# Patient Record
Sex: MALE | Race: WHITE | Employment: OTHER | URBAN - METROPOLITAN AREA
[De-identification: names, ages, dates, MRNs, and addresses within clinical notes are randomized per-mention and may not be internally consistent; named-entity substitution may affect disease eponyms.]

---

## 2017-01-23 PROBLEM — A60.00 GENITAL HERPES SIMPLEX: Status: ACTIVE | Noted: 2017-01-23

## 2017-01-23 PROBLEM — A60.00 GENITAL HERPES SIMPLEX: Status: RESOLVED | Noted: 2017-01-23 | Resolved: 2017-01-23

## 2017-08-02 DIAGNOSIS — B00.9 HERPES SIMPLEX: ICD-10-CM

## 2017-08-02 RX ORDER — VALACYCLOVIR HYDROCHLORIDE 500 MG/1
500 TABLET, FILM COATED ORAL 2 TIMES DAILY
Qty: 6 TABLET | Refills: 2 | Status: SHIPPED | OUTPATIENT
Start: 2017-08-02 | End: 2017-08-03 | Stop reason: SDUPTHER

## 2017-08-03 DIAGNOSIS — B00.9 HERPES SIMPLEX: ICD-10-CM

## 2017-08-03 RX ORDER — VALACYCLOVIR HYDROCHLORIDE 500 MG/1
500 TABLET, FILM COATED ORAL 2 TIMES DAILY
Qty: 30 TABLET | Refills: 2 | Status: SHIPPED | OUTPATIENT
Start: 2017-08-03 | End: 2017-11-07 | Stop reason: SDUPTHER

## 2017-09-25 ENCOUNTER — OFFICE VISIT (OUTPATIENT)
Dept: FAMILY MEDICINE CLINIC | Age: 72
End: 2017-09-25
Payer: MEDICARE

## 2017-09-25 VITALS
WEIGHT: 248.8 LBS | HEIGHT: 70 IN | SYSTOLIC BLOOD PRESSURE: 116 MMHG | DIASTOLIC BLOOD PRESSURE: 82 MMHG | BODY MASS INDEX: 35.62 KG/M2 | HEART RATE: 66 BPM

## 2017-09-25 DIAGNOSIS — E55.9 VITAMIN D DEFICIENCY: ICD-10-CM

## 2017-09-25 DIAGNOSIS — F41.1 GAD (GENERALIZED ANXIETY DISORDER): ICD-10-CM

## 2017-09-25 DIAGNOSIS — E66.9 OBESITY, UNSPECIFIED OBESITY SEVERITY, UNSPECIFIED OBESITY TYPE: ICD-10-CM

## 2017-09-25 DIAGNOSIS — E78.5 DYSLIPIDEMIA: ICD-10-CM

## 2017-09-25 DIAGNOSIS — I10 ESSENTIAL HYPERTENSION: ICD-10-CM

## 2017-09-25 DIAGNOSIS — Z13.29 SCREENING FOR THYROID DISORDER: Primary | ICD-10-CM

## 2017-09-25 DIAGNOSIS — Z13.6 SCREENING FOR AAA (ABDOMINAL AORTIC ANEURYSM): ICD-10-CM

## 2017-09-25 DIAGNOSIS — E53.9 VITAMIN B DEFICIENCY: ICD-10-CM

## 2017-09-25 DIAGNOSIS — Z23 NEED FOR INFLUENZA VACCINATION: ICD-10-CM

## 2017-09-25 DIAGNOSIS — Z12.5 SCREENING FOR PROSTATE CANCER: ICD-10-CM

## 2017-09-25 PROCEDURE — 1123F ACP DISCUSS/DSCN MKR DOCD: CPT | Performed by: FAMILY MEDICINE

## 2017-09-25 PROCEDURE — 99213 OFFICE O/P EST LOW 20 MIN: CPT | Performed by: FAMILY MEDICINE

## 2017-09-25 PROCEDURE — 3017F COLORECTAL CA SCREEN DOC REV: CPT | Performed by: FAMILY MEDICINE

## 2017-09-25 PROCEDURE — 1036F TOBACCO NON-USER: CPT | Performed by: FAMILY MEDICINE

## 2017-09-25 PROCEDURE — 4040F PNEUMOC VAC/ADMIN/RCVD: CPT | Performed by: FAMILY MEDICINE

## 2017-09-25 PROCEDURE — G8419 CALC BMI OUT NRM PARAM NOF/U: HCPCS | Performed by: FAMILY MEDICINE

## 2017-09-25 PROCEDURE — G8427 DOCREV CUR MEDS BY ELIG CLIN: HCPCS | Performed by: FAMILY MEDICINE

## 2017-09-25 ASSESSMENT — ENCOUNTER SYMPTOMS
SHORTNESS OF BREATH: 0
ABDOMINAL DISTENTION: 0
RHINORRHEA: 0
BACK PAIN: 0
CHEST TIGHTNESS: 0
NAUSEA: 0
EYE PAIN: 0
ABDOMINAL PAIN: 0
COLOR CHANGE: 0
DIARRHEA: 0
EYE DISCHARGE: 0
VOMITING: 0
CONSTIPATION: 0
SINUS PRESSURE: 0
VOICE CHANGE: 0
SORE THROAT: 0

## 2017-09-25 ASSESSMENT — PATIENT HEALTH QUESTIONNAIRE - PHQ9
SUM OF ALL RESPONSES TO PHQ QUESTIONS 1-9: 0
SUM OF ALL RESPONSES TO PHQ9 QUESTIONS 1 & 2: 0
2. FEELING DOWN, DEPRESSED OR HOPELESS: 0
1. LITTLE INTEREST OR PLEASURE IN DOING THINGS: 0

## 2017-10-23 LAB
ALBUMIN SERPL-MCNC: NORMAL G/DL
ALP BLD-CCNC: NORMAL U/L
ALT SERPL-CCNC: NORMAL U/L
ANION GAP SERPL CALCULATED.3IONS-SCNC: NORMAL MMOL/L
AST SERPL-CCNC: NORMAL U/L
BASOPHILS ABSOLUTE: NORMAL /ΜL
BASOPHILS RELATIVE PERCENT: NORMAL %
BILIRUB SERPL-MCNC: NORMAL MG/DL (ref 0.1–1.4)
BUN BLDV-MCNC: NORMAL MG/DL
CALCIUM SERPL-MCNC: NORMAL MG/DL
CHLORIDE BLD-SCNC: NORMAL MMOL/L
CO2: NORMAL MMOL/L
CREAT SERPL-MCNC: NORMAL MG/DL
EOSINOPHILS ABSOLUTE: NORMAL /ΜL
EOSINOPHILS RELATIVE PERCENT: NORMAL %
GFR CALCULATED: NORMAL
GLUCOSE BLD-MCNC: NORMAL MG/DL
HCT VFR BLD CALC: NORMAL % (ref 41–53)
HEMOGLOBIN: NORMAL G/DL (ref 13.5–17.5)
LYMPHOCYTES ABSOLUTE: NORMAL /ΜL
LYMPHOCYTES RELATIVE PERCENT: NORMAL %
MCH RBC QN AUTO: NORMAL PG
MCHC RBC AUTO-ENTMCNC: NORMAL G/DL
MCV RBC AUTO: NORMAL FL
MONOCYTES ABSOLUTE: NORMAL /ΜL
MONOCYTES RELATIVE PERCENT: NORMAL %
NEUTROPHILS ABSOLUTE: NORMAL /ΜL
NEUTROPHILS RELATIVE PERCENT: NORMAL %
PLATELET # BLD: NORMAL K/ΜL
PMV BLD AUTO: NORMAL FL
POTASSIUM SERPL-SCNC: NORMAL MMOL/L
PROSTATE SPECIFIC ANTIGEN: NORMAL NG/ML
RBC # BLD: NORMAL 10^6/ΜL
SODIUM BLD-SCNC: NORMAL MMOL/L
T4 FREE: NORMAL
TOTAL PROTEIN: NORMAL
TSH SERPL DL<=0.05 MIU/L-ACNC: NORMAL UIU/ML
VITAMIN B-12: NORMAL
VITAMIN D 25-HYDROXY: NORMAL
VITAMIN D2, 25 HYDROXY: NORMAL
VITAMIN D3,25 HYDROXY: NORMAL
WBC # BLD: NORMAL 10^3/ML

## 2017-10-27 DIAGNOSIS — Z13.29 SCREENING FOR THYROID DISORDER: ICD-10-CM

## 2017-10-27 DIAGNOSIS — E78.5 DYSLIPIDEMIA: ICD-10-CM

## 2017-10-27 DIAGNOSIS — E55.9 VITAMIN D DEFICIENCY: ICD-10-CM

## 2017-10-27 DIAGNOSIS — Z12.5 SCREENING FOR PROSTATE CANCER: ICD-10-CM

## 2017-10-27 DIAGNOSIS — E53.9 VITAMIN B DEFICIENCY: ICD-10-CM

## 2017-11-07 ENCOUNTER — HOSPITAL ENCOUNTER (OUTPATIENT)
Dept: VASCULAR LAB | Age: 72
Discharge: HOME | End: 2017-11-07
Payer: MEDICARE

## 2017-11-07 DIAGNOSIS — Z13.6 SCREENING FOR AAA (ABDOMINAL AORTIC ANEURYSM): Primary | ICD-10-CM

## 2017-11-07 PROCEDURE — 76706 US ABDL AORTA SCREEN AAA: CPT

## 2017-11-13 ENCOUNTER — OFFICE VISIT (OUTPATIENT)
Dept: FAMILY MEDICINE CLINIC | Age: 72
End: 2017-11-13
Payer: MEDICARE

## 2017-11-13 VITALS
DIASTOLIC BLOOD PRESSURE: 75 MMHG | WEIGHT: 250.6 LBS | SYSTOLIC BLOOD PRESSURE: 112 MMHG | BODY MASS INDEX: 35.88 KG/M2 | HEIGHT: 70 IN | HEART RATE: 75 BPM

## 2017-11-13 DIAGNOSIS — E66.9 OBESITY, UNSPECIFIED CLASSIFICATION, UNSPECIFIED OBESITY TYPE, UNSPECIFIED WHETHER SERIOUS COMORBIDITY PRESENT: ICD-10-CM

## 2017-11-13 DIAGNOSIS — I10 ESSENTIAL HYPERTENSION: ICD-10-CM

## 2017-11-13 DIAGNOSIS — Z00.00 ENCOUNTER FOR ANNUAL PHYSICAL EXAM: Primary | ICD-10-CM

## 2017-11-13 DIAGNOSIS — E55.9 VITAMIN D DEFICIENCY: ICD-10-CM

## 2017-11-13 DIAGNOSIS — Z12.11 SCREENING FOR COLORECTAL CANCER: ICD-10-CM

## 2017-11-13 DIAGNOSIS — F41.1 GAD (GENERALIZED ANXIETY DISORDER): ICD-10-CM

## 2017-11-13 DIAGNOSIS — Z12.12 SCREENING FOR COLORECTAL CANCER: ICD-10-CM

## 2017-11-13 DIAGNOSIS — E78.5 DYSLIPIDEMIA: ICD-10-CM

## 2017-11-13 DIAGNOSIS — R79.89 ELEVATED LFTS: ICD-10-CM

## 2017-11-13 DIAGNOSIS — Z13.6 SCREENING FOR AAA (ABDOMINAL AORTIC ANEURYSM): ICD-10-CM

## 2017-11-13 PROCEDURE — G0439 PPPS, SUBSEQ VISIT: HCPCS | Performed by: FAMILY MEDICINE

## 2017-11-13 RX ORDER — ERGOCALCIFEROL 1.25 MG/1
50000 CAPSULE ORAL WEEKLY
Qty: 4 CAPSULE | Refills: 3 | Status: SHIPPED | OUTPATIENT
Start: 2017-11-13 | End: 2018-02-15 | Stop reason: SDUPTHER

## 2017-11-13 ASSESSMENT — LIFESTYLE VARIABLES: HOW OFTEN DO YOU HAVE A DRINK CONTAINING ALCOHOL: 0

## 2017-11-13 ASSESSMENT — ANXIETY QUESTIONNAIRES: GAD7 TOTAL SCORE: 1

## 2017-11-13 ASSESSMENT — PATIENT HEALTH QUESTIONNAIRE - PHQ9: SUM OF ALL RESPONSES TO PHQ QUESTIONS 1-9: 0

## 2017-11-13 NOTE — PROGRESS NOTES
Medicare Annual Wellness Visit - Subsequent    Name: Elisa Philippe Date: 2017   MRN: [id number] Sex: Male   Age: 80 y.o. Ethnicity: Non-/Non    : 1945 Race: Petty Jc is here for   Chief Complaint   Patient presents with    Medicare AWV    Results     aaa screen        Screenings for behavioral, psychosocial and functional/safety risks, and cognitive dysfunction are all negative except as indicated below. These results, as well as other patient data from the [address] [facility] [address] form, are documented in Flowsheets linked to this Encounter. Allergies   Allergen Reactions    Dicloxacillin     Pcn [Penicillins]        Prior to Visit Medications    Medication Sig Taking?  Authorizing Provider   vitamin D (ERGOCALCIFEROL) 17411 units CAPS capsule Take 1 capsule by mouth once a week Yes Burgess Kevin MD   PARoxetine (PAXIL) 20 MG tablet take 1 tablet by mouth every morning Yes Burgess Kevin MD   valACYclovir (VALTREX) 500 MG tablet take 1 tablet by mouth twice a day Yes Burgess Kevin MD   atorvastatin (LIPITOR) 20 MG tablet Take 1 tablet by mouth daily Yes Burgess Kevin MD   metoprolol succinate (TOPROL XL) 25 MG extended release tablet Take 1 tablet by mouth daily Yes Burgess Kevin MD   omeprazole (PRILOSEC) 20 MG delayed release capsule Take 20 mg by mouth daily Yes Historical Provider, MD   finasteride (PROSCAR) 5 MG tablet Take 5 mg by mouth daily Yes Historical Provider, MD       Past Medical History:   Diagnosis Date    Dyslipidemia     ED (erectile dysfunction)     Genital herpes     PSA elevation     Social anxiety disorder        Past Surgical History:   Procedure Laterality Date    HAND SURGERY      x5    TONSILLECTOMY         Family History   Problem Relation Age of Onset    Diabetes Mother     Cancer Sister     Cancer Brother      Lung       CareTeam (Including outside providers/suppliers regularly involved in providing care): Patient Care Team:  Trent Beaver MD as PCP - General (Specialist)  Trent Beaver MD as PCP - S Attributed Provider    Wt Readings from Last 3 Encounters:   11/13/17 250 lb 9.6 oz (113.7 kg)   09/25/17 248 lb 12.8 oz (112.9 kg)   06/19/17 248 lb 9.6 oz (112.8 kg)     Vitals:    11/13/17 1255   BP: 112/75   Pulse: 75   Weight: 250 lb 9.6 oz (113.7 kg)   Height: 5' 10\" (1.778 m)       General Appearance: alert and oriented to person, place and time, well developed and well- nourished, in no acute distress  Skin: warm and dry, no rash or erythema  Head: normocephalic and atraumatic  Eyes: pupils equal, round, and reactive to light, extraocular eye movements intact, conjunctivae normal  ENT: tympanic membrane, external ear and ear canal normal bilaterally, nose without deformity, nasal mucosa and turbinates normal without polyps  Neck: supple and non-tender without mass, no thyromegaly or thyroid nodules, no cervical lymphadenopathy  Pulmonary/Chest: clear to auscultation bilaterally- no wheezes, rales or rhonchi, normal air movement, no respiratory distress  Cardiovascular: normal rate, regular rhythm, normal S1 and S2, no murmurs, rubs, clicks, or gallops, distal pulses intact, no carotid bruits  Abdomen: soft, non-tender, non-distended, normal bowel sounds, no masses or organomegaly  Extremities: no cyanosis, clubbing or edema. 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     Risk Factor Screenings with Interventions     Fall Risk:  Timed Up and Go Test > 12 seconds?: no (8 sec)  2 or more falls in past year?: no  Fall with injury in past year?: no  Fall Risk Interventions:    · Home safety tips saw audiology a couple of yeras ago and was adviced hearing aids but has never followed up. States noticed more and more difficulty hearing and plans to follow up soon. [facility] for yearly check up - last visit was approx a year ago, due currently, no concerns  Safety  Do you have working smoke detectors?: Yes  Have all throw rugs been removed or fastened?: (!) No  Do you have non-slip mats in all bathtubs?: (!) No  Do all of your stairways have a railing or banister?: Yes  Are your doorways, halls and stairs free of clutter?: Yes  Do you always fasten your seatbelt when you are in a car?: Yes  Safety Interventions:  · Home safety tips provided    ADLs  In the past 7 days, did you need help from others to perform any of the following everyday activities?: None  In the past 7 days, did you need help from others to take care of any of the following?: None  ADL Interventions:  · None indicated    Personalized Preventive Plan   Current Health Maintenance Status  Immunization History   Administered Date(s) Administered    Influenza Vaccine, unspecified formulation 09/21/2015    Influenza, High Dose 09/25/2017    Pneumococcal 13-valent Conjugate (Vinnie Raid) 02/01/2017        Health Maintenance   Topic Date Due    AAA screen  1945    Colon cancer screen colonoscopy  09/25/2016    DTaP/Tdap/Td vaccine (1 - Tdap) 11/30/2018 (Originally 11/29/1965)    Pneumococcal low/med risk (2 of 2 - PPSV23) 02/01/2018    Diabetes screen  02/01/2020    Lipid screen  02/17/2022    Zostavax vaccine  Addressed    Flu vaccine  Completed    Hepatitis C screen  Addressed       Recommendations for Preventive Services Due: see orders.   Recommended screening schedule for the next 5-10 years is provided to the patient in written form: see Patient Instructions/AVS.

## 2017-12-07 ENCOUNTER — HOSPITAL ENCOUNTER (OUTPATIENT)
Dept: ULTRASOUND IMAGING | Age: 72
Discharge: HOME | End: 2017-12-07
Payer: MEDICARE

## 2017-12-07 DIAGNOSIS — R79.89 ELEVATED LFTS: ICD-10-CM

## 2017-12-07 PROCEDURE — 76705 ECHO EXAM OF ABDOMEN: CPT

## 2018-02-05 ENCOUNTER — TELEPHONE (OUTPATIENT)
Dept: FAMILY MEDICINE CLINIC | Age: 73
End: 2018-02-05

## 2018-02-15 ENCOUNTER — OFFICE VISIT (OUTPATIENT)
Dept: FAMILY MEDICINE CLINIC | Age: 73
End: 2018-02-15
Payer: MEDICARE

## 2018-02-15 VITALS
DIASTOLIC BLOOD PRESSURE: 67 MMHG | TEMPERATURE: 98.3 F | WEIGHT: 247.2 LBS | BODY MASS INDEX: 35.39 KG/M2 | OXYGEN SATURATION: 93 % | HEART RATE: 74 BPM | SYSTOLIC BLOOD PRESSURE: 105 MMHG | HEIGHT: 70 IN

## 2018-02-15 DIAGNOSIS — F41.1 GAD (GENERALIZED ANXIETY DISORDER): ICD-10-CM

## 2018-02-15 DIAGNOSIS — I10 ESSENTIAL HYPERTENSION: ICD-10-CM

## 2018-02-15 DIAGNOSIS — R79.89 ELEVATED LFTS: ICD-10-CM

## 2018-02-15 DIAGNOSIS — E66.9 OBESITY, UNSPECIFIED CLASSIFICATION, UNSPECIFIED OBESITY TYPE, UNSPECIFIED WHETHER SERIOUS COMORBIDITY PRESENT: ICD-10-CM

## 2018-02-15 DIAGNOSIS — Z12.11 SCREEN FOR COLON CANCER: Primary | ICD-10-CM

## 2018-02-15 DIAGNOSIS — R73.9 HYPERGLYCEMIA: ICD-10-CM

## 2018-02-15 DIAGNOSIS — E78.5 DYSLIPIDEMIA: ICD-10-CM

## 2018-02-15 DIAGNOSIS — R97.20 ELEVATED PSA: ICD-10-CM

## 2018-02-15 DIAGNOSIS — Z23 NEED FOR PNEUMOCOCCAL VACCINATION: ICD-10-CM

## 2018-02-15 DIAGNOSIS — E55.9 VITAMIN D DEFICIENCY: ICD-10-CM

## 2018-02-15 DIAGNOSIS — G89.29 HIP PAIN, CHRONIC, RIGHT: ICD-10-CM

## 2018-02-15 DIAGNOSIS — M25.551 HIP PAIN, CHRONIC, RIGHT: ICD-10-CM

## 2018-02-15 PROCEDURE — G8417 CALC BMI ABV UP PARAM F/U: HCPCS | Performed by: FAMILY MEDICINE

## 2018-02-15 PROCEDURE — 4040F PNEUMOC VAC/ADMIN/RCVD: CPT | Performed by: FAMILY MEDICINE

## 2018-02-15 PROCEDURE — 90732 PPSV23 VACC 2 YRS+ SUBQ/IM: CPT | Performed by: FAMILY MEDICINE

## 2018-02-15 PROCEDURE — 1123F ACP DISCUSS/DSCN MKR DOCD: CPT | Performed by: FAMILY MEDICINE

## 2018-02-15 PROCEDURE — 3017F COLORECTAL CA SCREEN DOC REV: CPT | Performed by: FAMILY MEDICINE

## 2018-02-15 PROCEDURE — G8484 FLU IMMUNIZE NO ADMIN: HCPCS | Performed by: FAMILY MEDICINE

## 2018-02-15 PROCEDURE — 1036F TOBACCO NON-USER: CPT | Performed by: FAMILY MEDICINE

## 2018-02-15 PROCEDURE — G0009 ADMIN PNEUMOCOCCAL VACCINE: HCPCS | Performed by: FAMILY MEDICINE

## 2018-02-15 PROCEDURE — G8427 DOCREV CUR MEDS BY ELIG CLIN: HCPCS | Performed by: FAMILY MEDICINE

## 2018-02-15 PROCEDURE — 99214 OFFICE O/P EST MOD 30 MIN: CPT | Performed by: FAMILY MEDICINE

## 2018-02-15 RX ORDER — ERGOCALCIFEROL 1.25 MG/1
50000 CAPSULE ORAL WEEKLY
Qty: 12 CAPSULE | Refills: 0 | Status: SHIPPED | OUTPATIENT
Start: 2018-02-15 | End: 2018-05-17 | Stop reason: ALTCHOICE

## 2018-02-15 ASSESSMENT — ENCOUNTER SYMPTOMS
CHEST TIGHTNESS: 0
VOICE CHANGE: 0
ABDOMINAL PAIN: 0
BACK PAIN: 0
CONSTIPATION: 0
VOMITING: 0
NAUSEA: 0
SORE THROAT: 0
SHORTNESS OF BREATH: 0
DIARRHEA: 0
RHINORRHEA: 0
SINUS PRESSURE: 0
COLOR CHANGE: 0
EYE DISCHARGE: 0
ABDOMINAL DISTENTION: 0
EYE PAIN: 0

## 2018-02-15 ASSESSMENT — PATIENT HEALTH QUESTIONNAIRE - PHQ9
1. LITTLE INTEREST OR PLEASURE IN DOING THINGS: 0
SUM OF ALL RESPONSES TO PHQ9 QUESTIONS 1 & 2: 0
2. FEELING DOWN, DEPRESSED OR HOPELESS: 0
SUM OF ALL RESPONSES TO PHQ QUESTIONS 1-9: 0

## 2018-02-15 NOTE — PROGRESS NOTES
Subjective:      Patient ID: Kameron Gallo is a 71 y.o. male. HPI Had H/O double vision beginning of Dec 2017 - seen ophthalmology as well as neuro ophthalmology and neuro, was diagnosed with 4 th nerve palsy that resolved after 2 weeks. States started [place] system diet and wanted to make sure hip was ok before starting an exercise program.  Right hip has been bothering him x 20 years- states started swinging a weighted golf club and twisted the right hip , was laid up x a few weeks. Thinks he still has ongoing symptoms from that injury. Lateral right hip area sore and exercise makes it worse- walking over a couple of miles ( used to run 5 miles a day x 15 years)  Thinks his increased weight last few years may be also to blame. Pain occasionally felt at lateral right knee as well. Seated position on motorcycle also bothers him. H/O elevated PSA- seen Dr Yeimi Owens for the same, last in aug 2017. BP has been under control. NIKKI as well with current meds. Review of Systems   Constitutional: Negative for activity change, appetite change and fatigue. HENT: Negative for congestion, dental problem, hearing loss, rhinorrhea, sinus pressure, sneezing, sore throat, tinnitus and voice change. Eyes: Negative for pain, discharge and visual disturbance. Respiratory: Negative for chest tightness and shortness of breath. Cardiovascular: Negative for chest pain and palpitations. Gastrointestinal: Negative for abdominal distention, abdominal pain, constipation, diarrhea, nausea and vomiting. Endocrine: Negative for cold intolerance and heat intolerance. Genitourinary: Negative for decreased urine volume, difficulty urinating, frequency and urgency. Musculoskeletal: Positive for arthralgias, gait problem and myalgias. Negative for back pain and joint swelling. Skin: Negative for color change, pallor and rash. Allergic/Immunologic: Negative for environmental allergies and food allergies.    Neurological:

## 2018-02-21 ENCOUNTER — HOSPITAL ENCOUNTER (OUTPATIENT)
Dept: PHYSICAL THERAPY | Facility: CLINIC | Age: 73
Setting detail: THERAPIES SERIES
Discharge: HOME | End: 2018-02-21
Payer: MEDICARE

## 2018-02-21 PROCEDURE — G8979 MOBILITY GOAL STATUS: HCPCS

## 2018-02-21 PROCEDURE — 97162 PT EVAL MOD COMPLEX 30 MIN: CPT

## 2018-02-21 PROCEDURE — G8978 MOBILITY CURRENT STATUS: HCPCS

## 2018-02-21 PROCEDURE — 97110 THERAPEUTIC EXERCISES: CPT

## 2018-02-22 NOTE — CONSULTS
Enrique Fall Risk Assessment    Patient Name:  Yissel Foster  : 1945        Risk Factor Scale  Score   History of Falls [] Yes  [x] No 25  0 0   Secondary Diagnosis [] Yes  [x] No 15  0 0   Ambulatory Aid [] Furniture  [] Crutches/cane/walker  [x] None/bedrest/wheelchair/nurse 30  15  0 0   IV/Heparin Lock [] Yes  [x] No 20  0 0   Gait/Transferring [] Impaired  [] Weak  [x] Normal/bedrest/immobile 20  10  0 0   Mental Status [] Forgets limitations  [x] Oriented to own ability 15  0 0      Total:0     Based on the Assessment score: check the appropriate box.     [x]  No intervention needed   Low =   Score of 0-24    []  Use standard prevention interventions Moderate =  Score of 24-44   [] Give patient handout and discuss fall prevention strategies   [] Establish goal of education for patient/family RE: fall prevention strategies    []  Use high risk prevention interventions High = Score of 45 and higher   [] Give patient handout and discuss fall prevention strategies   [] Establish goal of education for patient/family Re: fall prevention strategies   [] Discuss lifeline / other resources    Electronically signed by:   Maricel Naranjo PT  Date: 2018

## 2018-02-26 ENCOUNTER — HOSPITAL ENCOUNTER (OUTPATIENT)
Dept: PHYSICAL THERAPY | Facility: CLINIC | Age: 73
Setting detail: THERAPIES SERIES
Discharge: HOME | End: 2018-02-26
Payer: MEDICARE

## 2018-02-26 PROCEDURE — 97110 THERAPEUTIC EXERCISES: CPT

## 2018-03-01 ENCOUNTER — HOSPITAL ENCOUNTER (OUTPATIENT)
Dept: PHYSICAL THERAPY | Facility: CLINIC | Age: 73
Setting detail: THERAPIES SERIES
Discharge: HOME | End: 2018-03-01
Payer: MEDICARE

## 2018-03-01 PROCEDURE — 97110 THERAPEUTIC EXERCISES: CPT

## 2018-03-06 ENCOUNTER — HOSPITAL ENCOUNTER (OUTPATIENT)
Dept: PHYSICAL THERAPY | Facility: CLINIC | Age: 73
Setting detail: THERAPIES SERIES
Discharge: HOME | End: 2018-03-06
Payer: MEDICARE

## 2018-03-06 PROCEDURE — 97110 THERAPEUTIC EXERCISES: CPT

## 2018-03-08 ENCOUNTER — HOSPITAL ENCOUNTER (OUTPATIENT)
Dept: PHYSICAL THERAPY | Facility: CLINIC | Age: 73
Setting detail: THERAPIES SERIES
Discharge: HOME | End: 2018-03-08
Payer: MEDICARE

## 2018-03-08 PROCEDURE — 97110 THERAPEUTIC EXERCISES: CPT

## 2018-03-08 NOTE — FLOWSHEET NOTE
[] Martha Hammonds       Outpatient Physical        Therapy       [address]       Phone: [phone]       Fax: [phone] [x] [facility] at [address]       Phone: [phone]       Fax: [phone] [] Law. [address] [place] [address] Health Promotion  [address] [place] [address]   Phone: [phone]   Fax:  [phone]     Physical Therapy Daily Treatment Note    Date:  3/8/2018  Patient Name:  Petra Duffy    :  1945  MRN: [id number]  Physician: [facility] Street: [place] no limit, medicare  Diagnosis: Chronic Right hip pain  Onset Date: 2/15/18   Next Dr. Vu Mess: May 2018  Visit# / total visits: 5/10  Cancels/No Shows: 0/0    Subjective:    Pain:  [] Yes  [x] No Location: Right hip Pain Rating: (0-10 scale) <1/10  Pain altered Tx:  [x] No  [] Yes  Action:    Comments: Patient reports feeling better.      Objective:  Modalities: side lie, using white and blue roller on right IT band, 5'  Precautions:   Exercises:  Exercise Reps/ Time Weight/ Level Comments   Recumbent bike 5 min M 15  not today, pt requests treadmill   Treadmill  8' 3.0 MPH Started 3/6/18 per pt request         sidelie stretch to right IT Band 5x 30 sec Overpressure by PT   Heel raises 25x  Added  3/1, not holding on         t band for hip standing   15x ea red  Increased reps 3/6/18, bilaterally 4 wayHEP    Mini Squat  15x  Increased reps 3/6/18   Side Step  30 ft each way Red  Added resistance 3/6/18         Figure 4 stretch 20\"x4 Bilaterally  Added , using towel   HS stretch  20\"x4 belt Added    Step ups - forward 30x each leg 6\" Added 3/1   To the side 30x each side 6\" addded 3/1         1 squat hip flexor stretch 4x 30 sec Added 3/8, kneeling on blue foam by mat side   Prone quad stretch 4x 30 sec Added 3/8   Clamshells 25x Red   red band added 3/1   Reverse Clamshells 25x Red   red band added 3/1   Hip Abduction  20x  Increased reps

## 2018-03-13 ENCOUNTER — HOSPITAL ENCOUNTER (OUTPATIENT)
Dept: PHYSICAL THERAPY | Facility: CLINIC | Age: 73
Setting detail: THERAPIES SERIES
Discharge: HOME | End: 2018-03-13
Payer: MEDICARE

## 2018-03-15 ENCOUNTER — HOSPITAL ENCOUNTER (OUTPATIENT)
Dept: PHYSICAL THERAPY | Facility: CLINIC | Age: 73
Setting detail: THERAPIES SERIES
Discharge: HOME | End: 2018-03-15
Payer: MEDICARE

## 2018-03-15 PROCEDURE — 97110 THERAPEUTIC EXERCISES: CPT

## 2018-03-15 NOTE — FLOWSHEET NOTE
[] Josefa Jarrlel       Outpatient Physical        Therapy       [address]       Phone: [phone]       Fax: [phone] [x] [facility] at [address] [facility]       Phone: [phone]       Fax: [phone] [] [facility]. [address] [place] [address] Health Promotion  [address] [facility]   Phone: [phone]   Fax:  [phone]     Physical Therapy Daily Treatment Note    Date:  3/15/2018  Patient Name:  Arlene Morris    :  1945  MRN: [id number]  Physician: [address] [place] [address] Street: [facility] no limit, medicare  Diagnosis: Chronic Right hip pain  Onset Date: 2/15/18   Next Dr. Reba García: May 2018  Visit# / total visits: 6/10  Cancels/No Shows: 0/0    Subjective:    Pain:  [] Yes  [x] No Location: Right hip Pain Rating: (0-10 scale) <1/10  Pain altered Tx:  [x] No  [] Yes  Action:    Comments: Patient reports feeling better. States he did ride motorcycle for 20 minutes and hips felt fine. Overall feeling much better.     Objective:  Modalities: side lie, using white and blue roller on right IT band, 5'  Precautions:   Exercises:  Exercise Reps/ Time Weight/ Level Comments   Recumbent bike 5 min M 15  not today, pt requests treadmill   Treadmill  9' 3.0 MPH Started 3/6/18 per pt request         sidelie stretch to right IT Band 5x 30 sec Overpressure by PT   Heel raises 25x  Added  3/1, not holding on         t band for hip standing   15x ea red  Increased reps 3/6/18, bilaterally 4 wayHEP    Mini Squat  15x  Increased reps 3/6/18   Side Step  30 ft each way Red  Added resistance 3/6/18         Figure 4 stretch 20\"x4 Bilaterally  Added , using towel   HS stretch  20\"x4 belt Added    Step ups - forward 30x each leg 6\" Added 3/1   To the side 30x each side 6\" addded 3/1   IR/ER of bilateral hips 5x eac 30 sec Supine, manual overpressure by PT                           1/ squat hip flexor stretch 4x 30 sec Added 3/8, kneeling on blue of Education:  [] Verbalizes understanding. [] Demonstrates understanding. [] Needs review. [x] Demonstrates/verbalizes HEP/Ed previously given. Plan: [x] Continue per plan of care. To try HEP for 2 weeks along with golf specific stretching and follow up with PT in 2 weeks.    [] Other:       Time In: 12:58 pm           Time Out: 2:00 pm    Electronically signed by:  Siri Greer PT

## 2018-03-20 ENCOUNTER — APPOINTMENT (OUTPATIENT)
Dept: PHYSICAL THERAPY | Facility: CLINIC | Age: 73
End: 2018-03-20
Payer: MEDICARE

## 2018-03-22 ENCOUNTER — APPOINTMENT (OUTPATIENT)
Dept: PHYSICAL THERAPY | Facility: CLINIC | Age: 73
End: 2018-03-22
Payer: MEDICARE

## 2018-03-29 ENCOUNTER — HOSPITAL ENCOUNTER (OUTPATIENT)
Dept: PHYSICAL THERAPY | Facility: CLINIC | Age: 73
Setting detail: THERAPIES SERIES
Discharge: HOME | End: 2018-03-29
Payer: MEDICARE

## 2018-03-29 PROCEDURE — G8980 MOBILITY D/C STATUS: HCPCS

## 2018-03-29 PROCEDURE — G8979 MOBILITY GOAL STATUS: HCPCS

## 2018-03-29 PROCEDURE — 97110 THERAPEUTIC EXERCISES: CPT

## 2018-03-29 NOTE — FLOWSHEET NOTE
increased hip pain. rode 27 min      Patient goals: Would love to be able to run - taking [employer] also to try to lose weight. Would like to be active and ride motorcycle without pain. Enjoys walking outside. Would like to return to golf, hasn't played in a few years. Pt. Education:  [x] Yes  [] No  [x] Reviewed Prior HEP/Ed   Method of Education: [x] Verbal  [x] Demo  [] Written  3- issued green band to allow pt to progress HEP as able  Comprehension of Education:  [] Verbalizes understanding. [] Demonstrates understanding. [] Needs review. [x] Demonstrates/verbalizes HEP/Ed previously given. Plan: [] Continue per plan of care.  .   [x] Other: discharge to HEP      Time In: 1:01 pm           Time Out: 1:48 pm    Electronically signed by:  Collette Henning, PT

## 2018-05-17 ENCOUNTER — OFFICE VISIT (OUTPATIENT)
Dept: FAMILY MEDICINE CLINIC | Age: 73
End: 2018-05-17
Payer: MEDICARE

## 2018-05-17 VITALS
HEART RATE: 62 BPM | SYSTOLIC BLOOD PRESSURE: 102 MMHG | WEIGHT: 220.2 LBS | HEIGHT: 70 IN | BODY MASS INDEX: 31.52 KG/M2 | DIASTOLIC BLOOD PRESSURE: 68 MMHG | OXYGEN SATURATION: 95 %

## 2018-05-17 DIAGNOSIS — A60.00 GENITAL HERPES SIMPLEX, UNSPECIFIED SITE: ICD-10-CM

## 2018-05-17 DIAGNOSIS — I10 ESSENTIAL HYPERTENSION: ICD-10-CM

## 2018-05-17 DIAGNOSIS — K21.9 GASTROESOPHAGEAL REFLUX DISEASE, ESOPHAGITIS PRESENCE NOT SPECIFIED: ICD-10-CM

## 2018-05-17 DIAGNOSIS — E78.5 DYSLIPIDEMIA: ICD-10-CM

## 2018-05-17 DIAGNOSIS — E55.9 VITAMIN D DEFICIENCY: Primary | ICD-10-CM

## 2018-05-17 DIAGNOSIS — F41.1 GAD (GENERALIZED ANXIETY DISORDER): ICD-10-CM

## 2018-05-17 DIAGNOSIS — N40.1 BENIGN PROSTATIC HYPERPLASIA WITH LOWER URINARY TRACT SYMPTOMS, SYMPTOM DETAILS UNSPECIFIED: ICD-10-CM

## 2018-05-17 PROCEDURE — G8427 DOCREV CUR MEDS BY ELIG CLIN: HCPCS | Performed by: FAMILY MEDICINE

## 2018-05-17 PROCEDURE — G8417 CALC BMI ABV UP PARAM F/U: HCPCS | Performed by: FAMILY MEDICINE

## 2018-05-17 PROCEDURE — 1036F TOBACCO NON-USER: CPT | Performed by: FAMILY MEDICINE

## 2018-05-17 PROCEDURE — 99214 OFFICE O/P EST MOD 30 MIN: CPT | Performed by: FAMILY MEDICINE

## 2018-05-17 PROCEDURE — 1123F ACP DISCUSS/DSCN MKR DOCD: CPT | Performed by: FAMILY MEDICINE

## 2018-05-17 PROCEDURE — 3017F COLORECTAL CA SCREEN DOC REV: CPT | Performed by: FAMILY MEDICINE

## 2018-05-17 PROCEDURE — 4040F PNEUMOC VAC/ADMIN/RCVD: CPT | Performed by: FAMILY MEDICINE

## 2018-05-17 RX ORDER — FINASTERIDE 5 MG/1
5 TABLET, FILM COATED ORAL DAILY
Qty: 90 TABLET | Refills: 1 | Status: SHIPPED | OUTPATIENT
Start: 2018-05-17 | End: 2019-04-19 | Stop reason: SDUPTHER

## 2018-05-17 RX ORDER — ATORVASTATIN CALCIUM 20 MG/1
1 TABLET, FILM COATED ORAL DAILY
Qty: 90 TABLET | Refills: 1 | Status: SHIPPED | OUTPATIENT
Start: 2018-05-17 | End: 2019-05-03 | Stop reason: SDUPTHER

## 2018-05-17 RX ORDER — VALACYCLOVIR HYDROCHLORIDE 500 MG/1
500 TABLET, FILM COATED ORAL DAILY
Qty: 90 TABLET | Refills: 1 | Status: SHIPPED | OUTPATIENT
Start: 2018-05-17 | End: 2018-12-07 | Stop reason: SDUPTHER

## 2018-05-17 RX ORDER — FAMOTIDINE 20 MG/1
20 TABLET, FILM COATED ORAL 2 TIMES DAILY
Qty: 180 TABLET | Refills: 1 | Status: SHIPPED | OUTPATIENT
Start: 2018-05-17 | End: 2019-05-03 | Stop reason: SDUPTHER

## 2018-05-17 RX ORDER — PAROXETINE HYDROCHLORIDE 20 MG/1
20 TABLET, FILM COATED ORAL DAILY
Qty: 90 TABLET | Refills: 1 | Status: SHIPPED | OUTPATIENT
Start: 2018-05-17 | End: 2019-02-05 | Stop reason: ALTCHOICE

## 2018-05-17 RX ORDER — MULTIVIT-MIN/IRON/FOLIC ACID/K 18-600-40
1 CAPSULE ORAL DAILY
COMMUNITY
End: 2018-10-26 | Stop reason: SDUPTHER

## 2018-05-17 RX ORDER — METOPROLOL SUCCINATE 25 MG/1
25 TABLET, EXTENDED RELEASE ORAL DAILY
Qty: 90 TABLET | Refills: 1 | Status: SHIPPED | OUTPATIENT
Start: 2018-05-17 | End: 2019-03-07 | Stop reason: SDUPTHER

## 2018-05-17 ASSESSMENT — ENCOUNTER SYMPTOMS
EYE PAIN: 0
DIARRHEA: 0
SORE THROAT: 0
BACK PAIN: 0
NAUSEA: 0
CHEST TIGHTNESS: 0
ABDOMINAL PAIN: 0
VOICE CHANGE: 0
RHINORRHEA: 0
CONSTIPATION: 0
COLOR CHANGE: 0
SINUS PRESSURE: 0
EYE DISCHARGE: 0
VOMITING: 0
SHORTNESS OF BREATH: 0
ABDOMINAL DISTENTION: 0

## 2018-05-17 ASSESSMENT — PATIENT HEALTH QUESTIONNAIRE - PHQ9
2. FEELING DOWN, DEPRESSED OR HOPELESS: 0
SUM OF ALL RESPONSES TO PHQ QUESTIONS 1-9: 0
1. LITTLE INTEREST OR PLEASURE IN DOING THINGS: 0
SUM OF ALL RESPONSES TO PHQ9 QUESTIONS 1 & 2: 0

## 2018-10-26 ENCOUNTER — HOSPITAL ENCOUNTER (OUTPATIENT)
Age: 73
Discharge: HOME | End: 2018-10-26
Payer: MEDICARE

## 2018-10-26 ENCOUNTER — HOSPITAL ENCOUNTER (OUTPATIENT)
Age: 73
Discharge: HOME | End: 2018-10-28
Payer: MEDICARE

## 2018-10-26 ENCOUNTER — HOSPITAL ENCOUNTER (OUTPATIENT)
Dept: GENERAL RADIOLOGY | Age: 73
Discharge: HOME | End: 2018-10-28
Payer: MEDICARE

## 2018-10-26 ENCOUNTER — OFFICE VISIT (OUTPATIENT)
Dept: FAMILY MEDICINE CLINIC | Age: 73
End: 2018-10-26
Payer: MEDICARE

## 2018-10-26 VITALS
HEART RATE: 61 BPM | SYSTOLIC BLOOD PRESSURE: 119 MMHG | BODY MASS INDEX: 30.92 KG/M2 | WEIGHT: 216 LBS | OXYGEN SATURATION: 94 % | HEIGHT: 70 IN | DIASTOLIC BLOOD PRESSURE: 79 MMHG

## 2018-10-26 DIAGNOSIS — M79.10 MYALGIA: ICD-10-CM

## 2018-10-26 DIAGNOSIS — M54.50 ACUTE LEFT-SIDED LOW BACK PAIN WITHOUT SCIATICA: ICD-10-CM

## 2018-10-26 DIAGNOSIS — M54.50 ACUTE LEFT-SIDED LOW BACK PAIN WITHOUT SCIATICA: Primary | ICD-10-CM

## 2018-10-26 DIAGNOSIS — R10.30 LOWER ABDOMINAL PAIN: ICD-10-CM

## 2018-10-26 LAB
ABSOLUTE EOS #: <0.03 K/UL (ref 0–0.44)
ABSOLUTE IMMATURE GRANULOCYTE: 0.08 K/UL (ref 0–0.3)
ABSOLUTE LYMPH #: 1.46 K/UL (ref 1.1–3.7)
ABSOLUTE MONO #: 1.07 K/UL (ref 0.1–1.2)
ALBUMIN SERPL-MCNC: 4.5 G/DL (ref 3.5–5.2)
ALBUMIN/GLOBULIN RATIO: 2 (ref 1–2.5)
ALP BLD-CCNC: 81 U/L (ref 40–129)
ALT SERPL-CCNC: 21 U/L (ref 5–41)
ANION GAP SERPL CALCULATED.3IONS-SCNC: 15 MMOL/L (ref 9–17)
AST SERPL-CCNC: 26 U/L
BASOPHILS # BLD: 1 % (ref 0–2)
BASOPHILS ABSOLUTE: 0.07 K/UL (ref 0–0.2)
BILIRUB SERPL-MCNC: 0.6 MG/DL (ref 0.3–1.2)
BUN BLDV-MCNC: 14 MG/DL (ref 8–23)
BUN/CREAT BLD: NORMAL (ref 9–20)
CALCIUM SERPL-MCNC: 10.1 MG/DL (ref 8.6–10.4)
CHLORIDE BLD-SCNC: 100 MMOL/L (ref 98–107)
CO2: 27 MMOL/L (ref 20–31)
CREAT SERPL-MCNC: 0.84 MG/DL (ref 0.7–1.2)
DIFFERENTIAL TYPE: ABNORMAL
EOSINOPHILS RELATIVE PERCENT: 0 % (ref 1–4)
GFR AFRICAN AMERICAN: >60 ML/MIN
GFR NON-AFRICAN AMERICAN: >60 ML/MIN
GFR SERPL CREATININE-BSD FRML MDRD: NORMAL ML/MIN/{1.73_M2}
GFR SERPL CREATININE-BSD FRML MDRD: NORMAL ML/MIN/{1.73_M2}
GLUCOSE BLD-MCNC: 99 MG/DL (ref 70–99)
HCT VFR BLD CALC: 45.2 % (ref 40.7–50.3)
HEMOGLOBIN: 15.5 G/DL (ref 13–17)
IMMATURE GRANULOCYTES: 1 %
LIPASE: 24 U/L (ref 13–60)
LYMPHOCYTES # BLD: 25 % (ref 24–43)
MCH RBC QN AUTO: 35.3 PG (ref 25.2–33.5)
MCHC RBC AUTO-ENTMCNC: 34.3 G/DL (ref 28.4–34.8)
MCV RBC AUTO: 103 FL (ref 82.6–102.9)
MONOCYTES # BLD: 18 % (ref 3–12)
NRBC AUTOMATED: 0 PER 100 WBC
PDW BLD-RTO: 13 % (ref 11.8–14.4)
PLATELET # BLD: 155 K/UL (ref 138–453)
PLATELET ESTIMATE: ABNORMAL
PMV BLD AUTO: 10.6 FL (ref 8.1–13.5)
POTASSIUM SERPL-SCNC: 4.5 MMOL/L (ref 3.7–5.3)
RBC # BLD: 4.39 M/UL (ref 4.21–5.77)
RBC # BLD: ABNORMAL 10*6/UL
SEG NEUTROPHILS: 55 % (ref 36–65)
SEGMENTED NEUTROPHILS ABSOLUTE COUNT: 3.2 K/UL (ref 1.5–8.1)
SODIUM BLD-SCNC: 142 MMOL/L (ref 135–144)
TOTAL PROTEIN: 6.7 G/DL (ref 6.4–8.3)
WBC # BLD: 5.9 K/UL (ref 3.5–11.3)
WBC # BLD: ABNORMAL 10*3/UL

## 2018-10-26 PROCEDURE — 4040F PNEUMOC VAC/ADMIN/RCVD: CPT | Performed by: FAMILY MEDICINE

## 2018-10-26 PROCEDURE — G8484 FLU IMMUNIZE NO ADMIN: HCPCS | Performed by: FAMILY MEDICINE

## 2018-10-26 PROCEDURE — 1101F PT FALLS ASSESS-DOCD LE1/YR: CPT | Performed by: FAMILY MEDICINE

## 2018-10-26 PROCEDURE — 83690 ASSAY OF LIPASE: CPT

## 2018-10-26 PROCEDURE — 3017F COLORECTAL CA SCREEN DOC REV: CPT | Performed by: FAMILY MEDICINE

## 2018-10-26 PROCEDURE — 1123F ACP DISCUSS/DSCN MKR DOCD: CPT | Performed by: FAMILY MEDICINE

## 2018-10-26 PROCEDURE — 1036F TOBACCO NON-USER: CPT | Performed by: FAMILY MEDICINE

## 2018-10-26 PROCEDURE — G8427 DOCREV CUR MEDS BY ELIG CLIN: HCPCS | Performed by: FAMILY MEDICINE

## 2018-10-26 PROCEDURE — 99214 OFFICE O/P EST MOD 30 MIN: CPT | Performed by: FAMILY MEDICINE

## 2018-10-26 PROCEDURE — 36415 COLL VENOUS BLD VENIPUNCTURE: CPT

## 2018-10-26 PROCEDURE — 85025 COMPLETE CBC W/AUTO DIFF WBC: CPT

## 2018-10-26 PROCEDURE — G8417 CALC BMI ABV UP PARAM F/U: HCPCS | Performed by: FAMILY MEDICINE

## 2018-10-26 PROCEDURE — 80053 COMPREHEN METABOLIC PANEL: CPT

## 2018-10-26 PROCEDURE — 72110 X-RAY EXAM L-2 SPINE 4/>VWS: CPT

## 2018-10-26 RX ORDER — BACLOFEN 10 MG/1
10 TABLET ORAL 3 TIMES DAILY
Qty: 30 TABLET | Refills: 0 | Status: SHIPPED | OUTPATIENT
Start: 2018-10-26 | End: 2019-02-05 | Stop reason: CLARIF

## 2018-10-26 ASSESSMENT — ENCOUNTER SYMPTOMS
ABDOMINAL PAIN: 1
SORE THROAT: 0
NAUSEA: 0
SHORTNESS OF BREATH: 0
COLOR CHANGE: 0
CONSTIPATION: 0
CHEST TIGHTNESS: 0
RHINORRHEA: 0
DIARRHEA: 0
VOMITING: 0
VOICE CHANGE: 0
EYE PAIN: 0
EYE DISCHARGE: 0
ABDOMINAL DISTENTION: 0
SINUS PRESSURE: 0
BACK PAIN: 0

## 2018-10-26 ASSESSMENT — PATIENT HEALTH QUESTIONNAIRE - PHQ9
2. FEELING DOWN, DEPRESSED OR HOPELESS: 0
SUM OF ALL RESPONSES TO PHQ QUESTIONS 1-9: 0
SUM OF ALL RESPONSES TO PHQ9 QUESTIONS 1 & 2: 0
SUM OF ALL RESPONSES TO PHQ QUESTIONS 1-9: 0
1. LITTLE INTEREST OR PLEASURE IN DOING THINGS: 0

## 2018-10-30 ENCOUNTER — OFFICE VISIT (OUTPATIENT)
Dept: FAMILY MEDICINE CLINIC | Age: 73
End: 2018-10-30
Payer: MEDICARE

## 2018-10-30 VITALS
HEART RATE: 60 BPM | HEIGHT: 70 IN | BODY MASS INDEX: 31.84 KG/M2 | OXYGEN SATURATION: 94 % | WEIGHT: 222.4 LBS | SYSTOLIC BLOOD PRESSURE: 111 MMHG | DIASTOLIC BLOOD PRESSURE: 77 MMHG

## 2018-10-30 DIAGNOSIS — M47.9 DEGENERATIVE JOINT DISEASE OF LOW BACK: ICD-10-CM

## 2018-10-30 DIAGNOSIS — M54.50 ACUTE LOW BACK PAIN WITHOUT SCIATICA, UNSPECIFIED BACK PAIN LATERALITY: ICD-10-CM

## 2018-10-30 DIAGNOSIS — M54.50 LOW BACK PAIN WITHOUT SCIATICA, UNSPECIFIED BACK PAIN LATERALITY, UNSPECIFIED CHRONICITY: ICD-10-CM

## 2018-10-30 DIAGNOSIS — I10 ESSENTIAL HYPERTENSION: ICD-10-CM

## 2018-10-30 DIAGNOSIS — E66.9 OBESITY, UNSPECIFIED CLASSIFICATION, UNSPECIFIED OBESITY TYPE, UNSPECIFIED WHETHER SERIOUS COMORBIDITY PRESENT: ICD-10-CM

## 2018-10-30 DIAGNOSIS — R10.9 ABDOMINAL PAIN, UNSPECIFIED ABDOMINAL LOCATION: Primary | ICD-10-CM

## 2018-10-30 PROCEDURE — G8427 DOCREV CUR MEDS BY ELIG CLIN: HCPCS | Performed by: FAMILY MEDICINE

## 2018-10-30 PROCEDURE — G8417 CALC BMI ABV UP PARAM F/U: HCPCS | Performed by: FAMILY MEDICINE

## 2018-10-30 PROCEDURE — 1036F TOBACCO NON-USER: CPT | Performed by: FAMILY MEDICINE

## 2018-10-30 PROCEDURE — 99213 OFFICE O/P EST LOW 20 MIN: CPT | Performed by: FAMILY MEDICINE

## 2018-10-30 PROCEDURE — 90662 IIV NO PRSV INCREASED AG IM: CPT | Performed by: FAMILY MEDICINE

## 2018-10-30 PROCEDURE — 3017F COLORECTAL CA SCREEN DOC REV: CPT | Performed by: FAMILY MEDICINE

## 2018-10-30 PROCEDURE — G0008 ADMIN INFLUENZA VIRUS VAC: HCPCS | Performed by: FAMILY MEDICINE

## 2018-10-30 PROCEDURE — 4040F PNEUMOC VAC/ADMIN/RCVD: CPT | Performed by: FAMILY MEDICINE

## 2018-10-30 PROCEDURE — 1123F ACP DISCUSS/DSCN MKR DOCD: CPT | Performed by: FAMILY MEDICINE

## 2018-10-30 PROCEDURE — G8482 FLU IMMUNIZE ORDER/ADMIN: HCPCS | Performed by: FAMILY MEDICINE

## 2018-10-30 PROCEDURE — 1101F PT FALLS ASSESS-DOCD LE1/YR: CPT | Performed by: FAMILY MEDICINE

## 2018-10-30 ASSESSMENT — ENCOUNTER SYMPTOMS
SORE THROAT: 0
WHEEZING: 0
COUGH: 0
SINUS PRESSURE: 0
COLOR CHANGE: 0
ABDOMINAL DISTENTION: 0
RHINORRHEA: 0
EYE DISCHARGE: 0
BACK PAIN: 0
VOICE CHANGE: 0
EYE PAIN: 0
SHORTNESS OF BREATH: 0
NAUSEA: 0
CONSTIPATION: 0
VOMITING: 0
DIARRHEA: 0
ABDOMINAL PAIN: 0
CHEST TIGHTNESS: 0

## 2018-10-30 NOTE — PROGRESS NOTES
Subjective:      Patient ID: Johnson Harris is a 71 y.o. male.     HPI    Review of Systems    Objective:   Physical Exam    Assessment:      ***      Plan:      ***        Patito Moreno MA

## 2018-10-30 NOTE — PROGRESS NOTES
Subjective:      Patient ID: Eunice Cool is a 71 y.o. male. HPI Here for follow up of his Labs as well as Lumbar Xrays after last visit. States his abdo discomfort   is down to a zero and back does not hurt. All labs as well as Xray discussed with patient. Review of Systems   Constitutional: Negative for activity change, appetite change and fatigue. HENT: Negative for congestion, dental problem, hearing loss, rhinorrhea, sinus pressure, sneezing, sore throat, tinnitus and voice change. Eyes: Negative for pain, discharge and visual disturbance. Respiratory: Negative for cough, chest tightness, shortness of breath and wheezing. Cardiovascular: Negative for chest pain and palpitations. Gastrointestinal: Negative for abdominal distention, abdominal pain, constipation, diarrhea, nausea and vomiting. Endocrine: Negative for cold intolerance and heat intolerance. Genitourinary: Negative for decreased urine volume, difficulty urinating, frequency and urgency. Musculoskeletal: Negative for arthralgias, back pain, gait problem, joint swelling and myalgias. Skin: Negative for color change, pallor and rash. Allergic/Immunologic: Negative for environmental allergies and food allergies. Neurological: Negative for dizziness, tremors, weakness and headaches. Hematological: Negative for adenopathy. Does not bruise/bleed easily. Psychiatric/Behavioral: Negative for agitation, behavioral problems and sleep disturbance. The patient is not nervous/anxious. Objective:   Physical Exam   Constitutional: He is oriented to person, place, and time. He appears well-developed and well-nourished. HENT:   Head: Normocephalic and atraumatic. Right Ear: External ear normal.   Left Ear: External ear normal.   Nose: Nose normal.   Mouth/Throat: Oropharynx is clear and moist. No oropharyngeal exudate. Eyes: Pupils are equal, round, and reactive to light. EOM are normal. Right eye exhibits no discharge. mouth daily 90 tablet 1    finasteride (PROSCAR) 5 MG tablet Take 1 tablet by mouth daily 90 tablet 1    PARoxetine (PAXIL) 20 MG tablet Take 1 tablet by mouth daily 90 tablet 1    Cholecalciferol (CVS VITAMIN D) 2000 units CAPS Take 1 capsule by mouth daily 90 capsule 5    valACYclovir (VALTREX) 500 MG tablet Take 1 tablet by mouth daily 90 tablet 1    famotidine (PEPCID) 20 MG tablet Take 1 tablet by mouth 2 times daily 180 tablet 1     No facility-administered encounter medications on file as of 10/30/2018.             Mir Lazar MD

## 2018-12-07 DIAGNOSIS — A60.00 GENITAL HERPES SIMPLEX, UNSPECIFIED SITE: ICD-10-CM

## 2018-12-07 RX ORDER — VALACYCLOVIR HYDROCHLORIDE 500 MG/1
TABLET, FILM COATED ORAL
Qty: 90 TABLET | Refills: 1 | Status: SHIPPED | OUTPATIENT
Start: 2018-12-07 | End: 2019-05-30 | Stop reason: SDUPTHER

## 2019-02-05 ENCOUNTER — OFFICE VISIT (OUTPATIENT)
Dept: FAMILY MEDICINE CLINIC | Age: 74
End: 2019-02-05
Payer: MEDICARE

## 2019-02-05 VITALS
WEIGHT: 233 LBS | HEIGHT: 69 IN | BODY MASS INDEX: 34.51 KG/M2 | HEART RATE: 60 BPM | SYSTOLIC BLOOD PRESSURE: 110 MMHG | DIASTOLIC BLOOD PRESSURE: 70 MMHG | OXYGEN SATURATION: 94 %

## 2019-02-05 DIAGNOSIS — E55.9 VITAMIN D DEFICIENCY: ICD-10-CM

## 2019-02-05 DIAGNOSIS — E66.9 OBESITY, UNSPECIFIED CLASSIFICATION, UNSPECIFIED OBESITY TYPE, UNSPECIFIED WHETHER SERIOUS COMORBIDITY PRESENT: ICD-10-CM

## 2019-02-05 DIAGNOSIS — Z12.5 SCREENING FOR MALIGNANT NEOPLASM OF PROSTATE: ICD-10-CM

## 2019-02-05 DIAGNOSIS — R73.9 HYPERGLYCEMIA: ICD-10-CM

## 2019-02-05 DIAGNOSIS — E78.5 DYSLIPIDEMIA: ICD-10-CM

## 2019-02-05 DIAGNOSIS — Z00.00 ROUTINE GENERAL MEDICAL EXAMINATION AT A HEALTH CARE FACILITY: Primary | ICD-10-CM

## 2019-02-05 DIAGNOSIS — E53.9 VITAMIN B DEFICIENCY: ICD-10-CM

## 2019-02-05 DIAGNOSIS — Z13.29 SCREENING FOR THYROID DISORDER: ICD-10-CM

## 2019-02-05 DIAGNOSIS — Z12.11 SCREEN FOR COLON CANCER: ICD-10-CM

## 2019-02-05 PROCEDURE — G0438 PPPS, INITIAL VISIT: HCPCS | Performed by: FAMILY MEDICINE

## 2019-02-05 PROCEDURE — 4040F PNEUMOC VAC/ADMIN/RCVD: CPT | Performed by: FAMILY MEDICINE

## 2019-02-05 PROCEDURE — G8482 FLU IMMUNIZE ORDER/ADMIN: HCPCS | Performed by: FAMILY MEDICINE

## 2019-02-05 ASSESSMENT — PATIENT HEALTH QUESTIONNAIRE - PHQ9
SUM OF ALL RESPONSES TO PHQ QUESTIONS 1-9: 0
2. FEELING DOWN, DEPRESSED OR HOPELESS: 0
1. LITTLE INTEREST OR PLEASURE IN DOING THINGS: 0
SUM OF ALL RESPONSES TO PHQ QUESTIONS 1-9: 0
SUM OF ALL RESPONSES TO PHQ9 QUESTIONS 1 & 2: 0

## 2019-02-05 ASSESSMENT — LIFESTYLE VARIABLES: HOW OFTEN DO YOU HAVE A DRINK CONTAINING ALCOHOL: 0

## 2019-02-05 ASSESSMENT — ANXIETY QUESTIONNAIRES: GAD7 TOTAL SCORE: 0

## 2019-02-11 ENCOUNTER — TELEPHONE (OUTPATIENT)
Dept: FAMILY MEDICINE CLINIC | Age: 74
End: 2019-02-11

## 2019-02-13 ENCOUNTER — HOSPITAL ENCOUNTER (OUTPATIENT)
Age: 74
Setting detail: SPECIMEN
Discharge: HOME | End: 2019-02-13
Payer: MEDICARE

## 2019-02-13 DIAGNOSIS — Z13.29 SCREENING FOR THYROID DISORDER: ICD-10-CM

## 2019-02-13 DIAGNOSIS — Z12.5 SCREENING FOR MALIGNANT NEOPLASM OF PROSTATE: ICD-10-CM

## 2019-02-13 DIAGNOSIS — E78.5 DYSLIPIDEMIA: ICD-10-CM

## 2019-02-13 DIAGNOSIS — E53.9 VITAMIN B DEFICIENCY: ICD-10-CM

## 2019-02-13 DIAGNOSIS — R73.9 HYPERGLYCEMIA: ICD-10-CM

## 2019-02-13 DIAGNOSIS — E55.9 VITAMIN D DEFICIENCY: ICD-10-CM

## 2019-02-13 LAB
ALBUMIN SERPL-MCNC: 4.1 G/DL (ref 3.5–5.2)
ALBUMIN/GLOBULIN RATIO: 1.9 (ref 1–2.5)
ALP BLD-CCNC: 70 U/L (ref 40–129)
ALT SERPL-CCNC: 21 U/L (ref 5–41)
ANION GAP SERPL CALCULATED.3IONS-SCNC: 12 MMOL/L (ref 9–17)
AST SERPL-CCNC: 23 U/L
BILIRUB SERPL-MCNC: 0.53 MG/DL (ref 0.3–1.2)
BUN BLDV-MCNC: 18 MG/DL (ref 8–23)
BUN/CREAT BLD: ABNORMAL (ref 9–20)
CALCIUM SERPL-MCNC: 9.7 MG/DL (ref 8.6–10.4)
CHLORIDE BLD-SCNC: 112 MMOL/L (ref 98–107)
CHOLESTEROL/HDL RATIO: 3.4
CHOLESTEROL: 123 MG/DL
CO2: 20 MMOL/L (ref 20–31)
CREAT SERPL-MCNC: 0.8 MG/DL (ref 0.7–1.2)
ESTIMATED AVERAGE GLUCOSE: 88 MG/DL
FOLATE: 14.3 NG/ML
GFR AFRICAN AMERICAN: >60 ML/MIN
GFR NON-AFRICAN AMERICAN: >60 ML/MIN
GFR SERPL CREATININE-BSD FRML MDRD: ABNORMAL ML/MIN/{1.73_M2}
GFR SERPL CREATININE-BSD FRML MDRD: ABNORMAL ML/MIN/{1.73_M2}
GLUCOSE BLD-MCNC: 118 MG/DL (ref 70–99)
HBA1C MFR BLD: 4.7 % (ref 4–6)
HDLC SERPL-MCNC: 36 MG/DL
LDL CHOLESTEROL: 63 MG/DL (ref 0–130)
POTASSIUM SERPL-SCNC: 4.4 MMOL/L (ref 3.7–5.3)
PROSTATE SPECIFIC ANTIGEN: 3.32 UG/L
SODIUM BLD-SCNC: 144 MMOL/L (ref 135–144)
THYROXINE, FREE: 1.35 NG/DL (ref 0.93–1.7)
TOTAL PROTEIN: 6.3 G/DL (ref 6.4–8.3)
TRIGL SERPL-MCNC: 120 MG/DL
TSH SERPL DL<=0.05 MIU/L-ACNC: 2.28 MIU/L (ref 0.3–5)
VITAMIN B-12: 1149 PG/ML (ref 232–1245)
VITAMIN D 25-HYDROXY: 30 NG/ML (ref 30–100)
VLDLC SERPL CALC-MCNC: ABNORMAL MG/DL (ref 1–30)

## 2019-02-16 LAB
INSULIN FREE: 30 UIU/ML (ref 3–19)
INSULIN: 36 UIU/ML (ref 3–19)

## 2019-03-07 DIAGNOSIS — M54.50 ACUTE LEFT-SIDED LOW BACK PAIN WITHOUT SCIATICA: ICD-10-CM

## 2019-03-07 DIAGNOSIS — I10 ESSENTIAL HYPERTENSION: ICD-10-CM

## 2019-03-07 RX ORDER — METOPROLOL SUCCINATE 25 MG/1
TABLET, EXTENDED RELEASE ORAL
Qty: 90 TABLET | Refills: 1 | Status: SHIPPED | OUTPATIENT
Start: 2019-03-07 | End: 2019-09-06 | Stop reason: SDUPTHER

## 2019-03-07 RX ORDER — BACLOFEN 10 MG/1
TABLET ORAL
Qty: 30 TABLET | Refills: 0 | OUTPATIENT
Start: 2019-03-07

## 2019-04-19 DIAGNOSIS — N40.1 BENIGN PROSTATIC HYPERPLASIA WITH LOWER URINARY TRACT SYMPTOMS, SYMPTOM DETAILS UNSPECIFIED: ICD-10-CM

## 2019-04-19 RX ORDER — FINASTERIDE 5 MG/1
TABLET, FILM COATED ORAL
Qty: 90 TABLET | Refills: 1 | Status: SHIPPED | OUTPATIENT
Start: 2019-04-19 | End: 2019-10-23 | Stop reason: SDUPTHER

## 2019-05-03 DIAGNOSIS — K21.9 GASTROESOPHAGEAL REFLUX DISEASE, ESOPHAGITIS PRESENCE NOT SPECIFIED: ICD-10-CM

## 2019-05-03 DIAGNOSIS — E78.5 DYSLIPIDEMIA: ICD-10-CM

## 2019-05-03 RX ORDER — FAMOTIDINE 20 MG/1
TABLET, FILM COATED ORAL
Qty: 180 TABLET | Refills: 1 | Status: SHIPPED | OUTPATIENT
Start: 2019-05-03 | End: 2020-04-16

## 2019-05-03 RX ORDER — ATORVASTATIN CALCIUM 20 MG/1
TABLET, FILM COATED ORAL
Qty: 90 TABLET | Refills: 1 | Status: SHIPPED | OUTPATIENT
Start: 2019-05-03 | End: 2020-04-16

## 2019-05-03 NOTE — TELEPHONE ENCOUNTER
Sia Bella is calling to request a refill on the following medication(s):  Requested Prescriptions     Pending Prescriptions Disp Refills    famotidine (PEPCID) 20 MG tablet [Pharmacy Med Name: FAMOTIDINE 20 MG TABLET] 180 tablet 1     Sig: take 1 tablet by mouth twice a day    atorvastatin (LIPITOR) 20 MG tablet [Pharmacy Med Name: ATORVASTATIN 20 MG TABLET] 90 tablet 1     Sig: take 1/2 tablet by mouth daily       Last Visit Date (If Applicable):  3/5/1888    Next Visit Date:    5/9/2019

## 2019-05-09 ENCOUNTER — OFFICE VISIT (OUTPATIENT)
Dept: FAMILY MEDICINE CLINIC | Age: 74
End: 2019-05-09
Payer: MEDICARE

## 2019-05-09 VITALS
BODY MASS INDEX: 33.59 KG/M2 | SYSTOLIC BLOOD PRESSURE: 100 MMHG | WEIGHT: 226.8 LBS | DIASTOLIC BLOOD PRESSURE: 75 MMHG | HEART RATE: 59 BPM | OXYGEN SATURATION: 92 % | HEIGHT: 69 IN

## 2019-05-09 DIAGNOSIS — I10 ESSENTIAL HYPERTENSION: ICD-10-CM

## 2019-05-09 DIAGNOSIS — Z87.898 HISTORY OF ELEVATED PSA: ICD-10-CM

## 2019-05-09 DIAGNOSIS — F41.1 GAD (GENERALIZED ANXIETY DISORDER): ICD-10-CM

## 2019-05-09 DIAGNOSIS — E66.9 OBESITY, UNSPECIFIED CLASSIFICATION, UNSPECIFIED OBESITY TYPE, UNSPECIFIED WHETHER SERIOUS COMORBIDITY PRESENT: ICD-10-CM

## 2019-05-09 DIAGNOSIS — M47.9 DEGENERATIVE JOINT DISEASE OF LOW BACK: ICD-10-CM

## 2019-05-09 DIAGNOSIS — G89.29 CHRONIC MIDLINE LOW BACK PAIN WITHOUT SCIATICA: Primary | ICD-10-CM

## 2019-05-09 DIAGNOSIS — E16.1 HYPERINSULINEMIA: ICD-10-CM

## 2019-05-09 DIAGNOSIS — Z12.11 SCREEN FOR COLON CANCER: ICD-10-CM

## 2019-05-09 DIAGNOSIS — E78.5 DYSLIPIDEMIA: ICD-10-CM

## 2019-05-09 DIAGNOSIS — M54.50 CHRONIC MIDLINE LOW BACK PAIN WITHOUT SCIATICA: Primary | ICD-10-CM

## 2019-05-09 DIAGNOSIS — K21.9 GASTROESOPHAGEAL REFLUX DISEASE, ESOPHAGITIS PRESENCE NOT SPECIFIED: ICD-10-CM

## 2019-05-09 PROCEDURE — 4040F PNEUMOC VAC/ADMIN/RCVD: CPT | Performed by: FAMILY MEDICINE

## 2019-05-09 PROCEDURE — 1123F ACP DISCUSS/DSCN MKR DOCD: CPT | Performed by: FAMILY MEDICINE

## 2019-05-09 PROCEDURE — 99214 OFFICE O/P EST MOD 30 MIN: CPT | Performed by: FAMILY MEDICINE

## 2019-05-09 PROCEDURE — 3017F COLORECTAL CA SCREEN DOC REV: CPT | Performed by: FAMILY MEDICINE

## 2019-05-09 PROCEDURE — G8427 DOCREV CUR MEDS BY ELIG CLIN: HCPCS | Performed by: FAMILY MEDICINE

## 2019-05-09 PROCEDURE — 1036F TOBACCO NON-USER: CPT | Performed by: FAMILY MEDICINE

## 2019-05-09 PROCEDURE — G8417 CALC BMI ABV UP PARAM F/U: HCPCS | Performed by: FAMILY MEDICINE

## 2019-05-09 ASSESSMENT — PATIENT HEALTH QUESTIONNAIRE - PHQ9
2. FEELING DOWN, DEPRESSED OR HOPELESS: 0
SUM OF ALL RESPONSES TO PHQ QUESTIONS 1-9: 0
SUM OF ALL RESPONSES TO PHQ QUESTIONS 1-9: 0
SUM OF ALL RESPONSES TO PHQ9 QUESTIONS 1 & 2: 0
1. LITTLE INTEREST OR PLEASURE IN DOING THINGS: 0

## 2019-05-09 ASSESSMENT — ENCOUNTER SYMPTOMS
NAUSEA: 0
ABDOMINAL DISTENTION: 0
ABDOMINAL PAIN: 0
RHINORRHEA: 0
SORE THROAT: 0
EYE PAIN: 0
EYE DISCHARGE: 0
VOICE CHANGE: 0
CHEST TIGHTNESS: 0
DIARRHEA: 0
COLOR CHANGE: 0
BACK PAIN: 0
CONSTIPATION: 0
VOMITING: 0
SHORTNESS OF BREATH: 0
SINUS PRESSURE: 0

## 2019-05-09 NOTE — PROGRESS NOTES
Visit Information    Have you changed or started any medications since your last visit including any over-the-counter medicines, vitamins, or herbal medicines? no   Are you having any side effects from any of your medications? -  no  Have you stopped taking any of your medications? Is so, why? -  no    Have you seen any other physician or provider since your last visit? No  Have you had any other diagnostic tests since your last visit? No  Have you been seen in the emergency room and/or had an admission to a hospital since we last saw you? No  Have you had your routine dental cleaning in the past 6 months? yes -     Have you activated your [employer] account? If not, what are your barriers?  Yes     Patient Care Team:  Kristin Asher MD as PCP - General (Specialist)    Medical History Review  Past Medical, Family, and Social History reviewed and does contribute to the patient presenting condition    Health Maintenance   Topic Date Due    Colon cancer screen colonoscopy  09/25/2016    Shingles Vaccine (1 of 2) 05/31/2019 (Originally 11/29/1996)    DTaP/Tdap/Td vaccine (1 - Tdap) 02/29/2020 (Originally 11/29/1965)    Potassium monitoring  02/13/2020    Creatinine monitoring  02/13/2020    Lipid screen  02/13/2024    Flu vaccine  Completed    Pneumococcal 65+ years Vaccine  Completed    AAA screen  Completed    Hepatitis C screen  Addressed

## 2019-05-09 NOTE — PROGRESS NOTES
Subjective:      Patient ID: Ever Loco is a 68 y.o. male. HPI  States low back has been bothering him more recently and last PT done was 6 months to a year ago. States home exercises were not helping much , so sopped doing them regularly. His activities are limited due to the pain. States sees dr [employer] fro his knees but not seen anyone for the back yet. He has had shots to the knees that has helped him a lot , so would like to try this for his back as well. Recently had labs and these were reviewed with patient. H/O elevated PSA, now WNL- follows with Dr Thang Gallardo. Also states NIKKI sx are well controlled , so is his GERD sx for which he takes pepcid QOD as needed. Review of Systems   Constitutional: Negative for activity change, appetite change and fatigue. HENT: Negative for congestion, dental problem, hearing loss, rhinorrhea, sinus pressure, sneezing, sore throat, tinnitus and voice change. Eyes: Negative for pain, discharge and visual disturbance. Respiratory: Negative for chest tightness and shortness of breath. Cardiovascular: Negative for chest pain and palpitations. Gastrointestinal: Negative for abdominal distention, abdominal pain, constipation, diarrhea, nausea and vomiting. Endocrine: Negative for cold intolerance and heat intolerance. Genitourinary: Negative for decreased urine volume, difficulty urinating, frequency and urgency. Musculoskeletal: Negative for arthralgias, back pain, gait problem, joint swelling and myalgias. Skin: Negative for color change, pallor and rash. Allergic/Immunologic: Negative for environmental allergies and food allergies. Neurological: Negative for dizziness, tremors, weakness and headaches. Hematological: Negative for adenopathy. Does not bruise/bleed easily. Psychiatric/Behavioral: Negative for agitation, behavioral problems and sleep disturbance. The patient is not nervous/anxious.         Objective:   Physical Exam Musculoskeletal:        Right shoulder: He exhibits pain. Arms:    ROM back ok but pain triggered by bending, standing and walking triggers on pain and laying down helps relieve the pain. Toe and heel walk ok. Squatting ok. Assessment:       Diagnosis Orders   1. Chronic midline low back pain without sciatica     2. NIKKI (generalized anxiety disorder)     3. Dyslipidemia     4. Essential hypertension     5. Obesity, unspecified classification, unspecified obesity type, unspecified whether serious comorbidity present     6. Degenerative joint disease of low back     7. Hyperinsulinemia     8. History of elevated PSA     9. Gastroesophageal reflux disease, esophagitis presence not specified           Plan:      No orders of the defined types were placed in this encounter. Outpatient Encounter Medications as of 5/9/2019   Medication Sig Dispense Refill    famotidine (PEPCID) 20 MG tablet take 1 tablet by mouth twice a day 180 tablet 1    atorvastatin (LIPITOR) 20 MG tablet take 1/2 tablet by mouth daily 90 tablet 1    finasteride (PROSCAR) 5 MG tablet take 1 tablet by mouth once daily 90 tablet 1    metoprolol succinate (TOPROL XL) 25 MG extended release tablet take 1 tablet by mouth once daily 90 tablet 1    valACYclovir (VALTREX) 500 MG tablet take 1 tablet by mouth once daily 90 tablet 1    Cholecalciferol (CVS VITAMIN D) 2000 units CAPS Take 1 capsule by mouth daily 90 capsule 5     No facility-administered encounter medications on file as of 5/9/2019.             Percy Malloy MD

## 2019-05-30 DIAGNOSIS — A60.00 GENITAL HERPES SIMPLEX, UNSPECIFIED SITE: ICD-10-CM

## 2019-05-30 RX ORDER — VALACYCLOVIR HYDROCHLORIDE 500 MG/1
TABLET, FILM COATED ORAL
Qty: 90 TABLET | Refills: 1 | Status: SHIPPED | OUTPATIENT
Start: 2019-05-30 | End: 2019-12-02 | Stop reason: SDUPTHER

## 2019-06-27 ENCOUNTER — INITIAL CONSULT (OUTPATIENT)
Dept: PAIN MANAGEMENT | Age: 74
End: 2019-06-27
Payer: MEDICARE

## 2019-06-27 VITALS
BODY MASS INDEX: 34.04 KG/M2 | HEART RATE: 66 BPM | DIASTOLIC BLOOD PRESSURE: 67 MMHG | SYSTOLIC BLOOD PRESSURE: 103 MMHG | OXYGEN SATURATION: 96 % | HEIGHT: 69 IN | WEIGHT: 229.8 LBS

## 2019-06-27 DIAGNOSIS — M79.604 RIGHT LEG PAIN: ICD-10-CM

## 2019-06-27 DIAGNOSIS — M54.50 CHRONIC BILATERAL LOW BACK PAIN WITHOUT SCIATICA: Primary | ICD-10-CM

## 2019-06-27 DIAGNOSIS — M51.36 DDD (DEGENERATIVE DISC DISEASE), LUMBAR: ICD-10-CM

## 2019-06-27 DIAGNOSIS — G89.29 CHRONIC BILATERAL LOW BACK PAIN WITHOUT SCIATICA: Primary | ICD-10-CM

## 2019-06-27 PROCEDURE — 3017F COLORECTAL CA SCREEN DOC REV: CPT | Performed by: ANESTHESIOLOGY

## 2019-06-27 PROCEDURE — 4040F PNEUMOC VAC/ADMIN/RCVD: CPT | Performed by: ANESTHESIOLOGY

## 2019-06-27 PROCEDURE — 1123F ACP DISCUSS/DSCN MKR DOCD: CPT | Performed by: ANESTHESIOLOGY

## 2019-06-27 PROCEDURE — 99204 OFFICE O/P NEW MOD 45 MIN: CPT | Performed by: ANESTHESIOLOGY

## 2019-06-27 PROCEDURE — G8417 CALC BMI ABV UP PARAM F/U: HCPCS | Performed by: ANESTHESIOLOGY

## 2019-06-27 PROCEDURE — 1036F TOBACCO NON-USER: CPT | Performed by: ANESTHESIOLOGY

## 2019-06-27 PROCEDURE — G8427 DOCREV CUR MEDS BY ELIG CLIN: HCPCS | Performed by: ANESTHESIOLOGY

## 2019-06-27 RX ORDER — BACLOFEN 10 MG/1
10 TABLET ORAL NIGHTLY
Qty: 30 TABLET | Refills: 0 | Status: SHIPPED | OUTPATIENT
Start: 2019-06-27

## 2019-06-27 ASSESSMENT — ENCOUNTER SYMPTOMS
ALLERGIC/IMMUNOLOGIC NEGATIVE: 1
EYES NEGATIVE: 1
GASTROINTESTINAL NEGATIVE: 1
BACK PAIN: 1
APNEA: 1

## 2019-06-27 NOTE — COMMUNICATION BODY
Assessment:     61-year-old pleasant man with history of chronic back pain  Patient states that he had chronic axial lower back pain for many years  He reports significant stiffness in the morning and is back which improves as the day goes by  He had a history of frequent flareup of his back pain that last for several weeks and he recalls that his back gave out  He had a flareup 2 years ago when he felt a pop in his back while playing golf and since then pain is radiating into the lower abdomen below the arm like this area and into the groin also radiating down the right leg from hip over outer thigh to the knee. No leg radiation below the knee level  No associated numbness or paresthesia  No changes in bladder or bowel control  No history of fever chills or weight loss    Patient did physical therapy without any significant improvement in pain and discontinued after attending 7 sessions for lack of progress  Had recent plain x-ray thoracic and lumbar spine film  No previous lumbar spine MRI  No previous lumbar spine injection history  No previous lumbar spine surgical history    He was recently evaluated by the orthopedic surgeon Dr. Gus Mcmillan at [facility] and had hip x-rays and was told by the orthopedic physician that his pain is not related to his hips  Pain score today  2 now however it goes up to a 6    1. Chronic bilateral low back pain without sciatica    2. DDD (degenerative disc disease), lumbar    3.  Right leg pain            Plan:     Chronic predominantly axial nonradicular lumbar spinal pain progressively worsening affecting daily life activities refractory to physical therapy  NSAIDs are contraindicated because of hematuria history  Clinical exam suggest facet mediated pain  Will advise for diagnostic lumbar medial branch nerve block  Patient gets good short-term pain relief then will consider for radiofrequency ablation      recent flare up with radiation in right proximal leg and lower abdomen and groin suggest disc disease in upper lumbar spine  I will order an MRI lumbar spine    Orders Placed This Encounter   Procedures    MRI Lumbar Spine WO Contrast     Standing Status:   Future     Standing Expiration Date:   6/27/2020    MS INJ DX/THER AGNT PARAVERT FACET JOINT, LUMBAR/SAC, 1ST LEVEL     Bilateral lumbar medial branch nerve block L2-L5     Standing Status:   Future     Standing Expiration Date:   9/27/2019    MS INJ DX/THER AGNT PARAVERT FACET JOINT, LUMBAR/SAC, 1ST LEVEL     Bilateral lumbar medial branch nerve block L2-L5     Standing Status:   Future     Standing Expiration Date:   9/27/2019     Orders Placed This Encounter   Medications    baclofen (LIORESAL) 10 MG tablet     Sig: Take 1 tablet by mouth nightly     Dispense:  30 tablet     Refill:  0       Controlled Substance Monitoring:    Acute and Chronic Pain Monitoring:   RX Monitoring 6/27/2019   Periodic Controlled Substance Monitoring No signs of potential drug abuse or diversion identified.

## 2019-06-27 NOTE — PROGRESS NOTES
pain, salon pas patches   10. Associated symptoms (numbness / tingling / weakness):  None   -constant or intermitting:n/a  11. Red Flags: (weight loss / chills / loss of bladder or bowel control):no     Previous management history  1. Previous diagnostic workup: (Imaging/EMG)   CT, MRI, or Xray: 10/26/18 lumbar x-ray, hip and lumbar Xray  9/21/15, Xray at [facility] 6/19/19   What facility did they have it at:[facility]  What year or specific date: above   EMG:  no    2. Previous non interventional treatments tried:  chiropractor or physical therapy: PT @ [address]  What part of the body:back and hip  What facility was it done at: above  How long ago was it last tried:year  Did it work: yes helped the hip pain  Did they complete it:Yes    3. Previous Medications tried  NSAID's: yes, Ibuprofen OTC,  Had TERP surgery and avoids NSAIDS  Neurontin: no  Lyrica: no  Trycyclic antidepressant (Ellavil / Pamelor ): Paxil-gave him tremors   Cymbalta: no  Opioids : baclofen-takes half a pill, no Opoiods tried   Last Pain medication taken (name of med and date): Arthritc pain med OTC this am    4. Previous Interventional pain procedures tried:No Pm in the past   What kind of injection:in the knees but never in the back or hips   Who did the injection: Dr Laurence Rice  did the injection help:Yes  Last time injection was done:3 mos ago    5. Previous surgeries for pain  No surgeries in these boaz     Social History:  Marital status:   Employment History:retired   Working  No  Full time Or Part time: n/a  Disability  no  Legal Issues related to pain complaint: no     Pain Disability Index score : 19    Lab Results   Component Value Date    LABA1C 4.7 02/13/2019     Lab Results   Component Value Date    EAG 88 02/13/2019         Informant:patient     Review of Systems   Constitutional: Negative. HENT: Negative. Eyes: Negative. Respiratory: Positive for apnea (sleep apnea). Cardiovascular: Negative. quittin.4    Smokeless tobacco: Never Used   Substance and Sexual Activity    Alcohol use: None    Drug use: None    Sexual activity: None   Lifestyle    Physical activity:     Days per week: None     Minutes per session: None    Stress: None   Relationships    Social connections:     Talks on phone: None     Gets together: None     Attends Congregation service: None     Active member of club or organization: None     Attends meetings of clubs or organizations: None     Relationship status: None    Intimate partner violence:     Fear of current or ex partner: None     Emotionally abused: None     Physically abused: None     Forced sexual activity: None   Other Topics Concern    None   Social History Narrative    None     Family History   Problem Relation Age of Onset    Diabetes Mother     Cancer Sister     Cancer Brother         Lung       Objective:   Physical Exam   Constitutional: He is oriented to person, place, and time. He appears well-developed and well-nourished. No distress. HENT:   Head: Normocephalic and atraumatic. Eyes: Pupils are equal, round, and reactive to light. Conjunctivae and EOM are normal. Right eye exhibits no discharge. Left eye exhibits no discharge. Neck: Normal range of motion. Neck supple. Cardiovascular: Normal rate, regular rhythm and normal heart sounds. Pulmonary/Chest: Effort normal and breath sounds normal. No stridor. No respiratory distress. He has no wheezes. Abdominal: Soft. He exhibits no distension. There is no tenderness. Musculoskeletal: He exhibits no edema or deformity. Lumbar back: He exhibits decreased range of motion, tenderness, pain and spasm.    No apparent spine deformity on inspection  Active range of motion is limited and associated with pain  Positive tenderness to palpation over bilateral lumbar paraspinal muscles  Facet loading maneuver is positive  Gait is stable and well-balanced   Neurological: He is alert and oriented to person, place, and time. He has normal strength. He displays no atrophy and normal reflexes. No cranial nerve deficit or sensory deficit. He exhibits normal muscle tone. He displays no seizure activity. Coordination and gait normal.   Reflex Scores:       Patellar reflexes are 2+ on the right side and 2+ on the left side. Skin: Skin is warm and dry. Psychiatric: He has a normal mood and affect. His behavior is normal. Judgment and thought content normal.   Nursing note and vitals reviewed. Assessment:      64-year-old pleasant man with history of chronic back pain  Patient states that he had chronic axial lower back pain for many years  He reports significant stiffness in the morning and is back which improves as the day goes by  He had a history of frequent flareup of his back pain that last for several weeks and he recalls that his back gave out  He had a flareup 2 years ago when he felt a pop in his back while playing golf and since then pain is radiating into the lower abdomen below the arm like this area and into the groin also radiating down the right leg from hip over outer thigh to the knee. No leg radiation below the knee level  No associated numbness or paresthesia  No changes in bladder or bowel control  No history of fever chills or weight loss    Patient did physical therapy without any significant improvement in pain and discontinued after attending 7 sessions for lack of progress  Had recent plain x-ray thoracic and lumbar spine film  No previous lumbar spine MRI  No previous lumbar spine injection history  No previous lumbar spine surgical history    He was recently evaluated by the orthopedic surgeon Dr. Sukumar Singh at [facility] and had hip x-rays and was told by the orthopedic physician that his pain is not related to his hips  Pain score today  2 now however it goes up to a 6    1. Chronic bilateral low back pain without sciatica    2. DDD (degenerative disc disease), lumbar    3.

## 2019-06-27 NOTE — LETTER
[facility] [address]  [address] [facility] [address]  Phone: [phone]  Fax: [phone]    Reji Cazares MD        June 27, 2019     Alana Osborne MD  [address], [address]  [facility] [address]    Patient: Shital Yung  MR Number: [id number]  YOB: 1945  Date of Visit: 6/27/2019    Dear Dr. Alana Osborne:    Thank you for the request for consultation for Tameka Gracia to me for the evaluation of chronic low back pain. Below are the relevant portions of my assessment and plan of care. Assessment:     78-year-old pleasant man with history of chronic back pain  Patient states that he had chronic axial lower back pain for many years  He reports significant stiffness in the morning and is back which improves as the day goes by  He had a history of frequent flareup of his back pain that last for several weeks and he recalls that his back gave out  He had a flareup 2 years ago when he felt a pop in his back while playing golf and since then pain is radiating into the lower abdomen below the arm like this area and into the groin also radiating down the right leg from hip over outer thigh to the knee.   No leg radiation below the knee level  No associated numbness or paresthesia  No changes in bladder or bowel control  No history of fever chills or weight loss    Patient did physical therapy without any significant improvement in pain and discontinued after attending 7 sessions for lack of progress  Had recent plain x-ray thoracic and lumbar spine film  No previous lumbar spine MRI  No previous lumbar spine injection history  No previous lumbar spine surgical history    He was recently evaluated by the orthopedic surgeon Dr. Lizbeth Patino at [facility] clinic and had hip x-rays and was told by the orthopedic physician that his pain is not related to his hips  Pain score today  2 now however it goes up to a 6

## 2019-07-11 ENCOUNTER — TELEPHONE (OUTPATIENT)
Dept: PAIN MANAGEMENT | Age: 74
End: 2019-07-11

## 2019-07-15 ENCOUNTER — TELEPHONE (OUTPATIENT)
Dept: PAIN MANAGEMENT | Age: 74
End: 2019-07-15

## 2019-08-19 ENCOUNTER — TELEPHONE (OUTPATIENT)
Dept: FAMILY MEDICINE CLINIC | Age: 74
End: 2019-08-19

## 2019-09-06 DIAGNOSIS — I10 ESSENTIAL HYPERTENSION: ICD-10-CM

## 2019-09-06 RX ORDER — METOPROLOL SUCCINATE 25 MG/1
TABLET, EXTENDED RELEASE ORAL
Qty: 90 TABLET | Refills: 1 | Status: SHIPPED | OUTPATIENT
Start: 2019-09-06 | End: 2020-03-02

## 2019-09-06 NOTE — TELEPHONE ENCOUNTER
Leni Arcos is calling to request a refill on the following medication(s):  Requested Prescriptions     Pending Prescriptions Disp Refills    metoprolol succinate (TOPROL XL) 25 MG extended release tablet [Pharmacy Med Name: METOPROLOL SUCC ER 25 MG TAB] 90 tablet 1     Sig: take 1 tablet by mouth once daily       Last Visit Date (If Applicable):  3/8/5758    Next Visit Date:    9/25/2019

## 2019-10-02 ENCOUNTER — OFFICE VISIT (OUTPATIENT)
Dept: FAMILY MEDICINE CLINIC | Age: 74
End: 2019-10-02
Payer: MEDICARE

## 2019-10-02 VITALS
WEIGHT: 236.2 LBS | HEIGHT: 69 IN | DIASTOLIC BLOOD PRESSURE: 79 MMHG | BODY MASS INDEX: 34.98 KG/M2 | HEART RATE: 61 BPM | SYSTOLIC BLOOD PRESSURE: 121 MMHG | OXYGEN SATURATION: 97 %

## 2019-10-02 DIAGNOSIS — E78.5 DYSLIPIDEMIA: ICD-10-CM

## 2019-10-02 DIAGNOSIS — E66.9 OBESITY, UNSPECIFIED CLASSIFICATION, UNSPECIFIED OBESITY TYPE, UNSPECIFIED WHETHER SERIOUS COMORBIDITY PRESENT: ICD-10-CM

## 2019-10-02 DIAGNOSIS — Z23 NEED FOR INFLUENZA VACCINATION: Primary | ICD-10-CM

## 2019-10-02 DIAGNOSIS — I10 ESSENTIAL HYPERTENSION: ICD-10-CM

## 2019-10-02 DIAGNOSIS — F41.1 GAD (GENERALIZED ANXIETY DISORDER): ICD-10-CM

## 2019-10-02 DIAGNOSIS — K21.9 GASTROESOPHAGEAL REFLUX DISEASE, ESOPHAGITIS PRESENCE NOT SPECIFIED: ICD-10-CM

## 2019-10-02 DIAGNOSIS — E88.81 INSULIN RESISTANCE: ICD-10-CM

## 2019-10-02 DIAGNOSIS — M47.9 DEGENERATIVE JOINT DISEASE OF LOW BACK: ICD-10-CM

## 2019-10-02 PROCEDURE — 3017F COLORECTAL CA SCREEN DOC REV: CPT | Performed by: FAMILY MEDICINE

## 2019-10-02 PROCEDURE — 4040F PNEUMOC VAC/ADMIN/RCVD: CPT | Performed by: FAMILY MEDICINE

## 2019-10-02 PROCEDURE — 1036F TOBACCO NON-USER: CPT | Performed by: FAMILY MEDICINE

## 2019-10-02 PROCEDURE — G0008 ADMIN INFLUENZA VIRUS VAC: HCPCS | Performed by: FAMILY MEDICINE

## 2019-10-02 PROCEDURE — 99214 OFFICE O/P EST MOD 30 MIN: CPT | Performed by: FAMILY MEDICINE

## 2019-10-02 PROCEDURE — G8427 DOCREV CUR MEDS BY ELIG CLIN: HCPCS | Performed by: FAMILY MEDICINE

## 2019-10-02 PROCEDURE — G8417 CALC BMI ABV UP PARAM F/U: HCPCS | Performed by: FAMILY MEDICINE

## 2019-10-02 PROCEDURE — 90653 IIV ADJUVANT VACCINE IM: CPT | Performed by: FAMILY MEDICINE

## 2019-10-02 PROCEDURE — 1123F ACP DISCUSS/DSCN MKR DOCD: CPT | Performed by: FAMILY MEDICINE

## 2019-10-02 PROCEDURE — G8482 FLU IMMUNIZE ORDER/ADMIN: HCPCS | Performed by: FAMILY MEDICINE

## 2019-10-02 ASSESSMENT — PATIENT HEALTH QUESTIONNAIRE - PHQ9
2. FEELING DOWN, DEPRESSED OR HOPELESS: 0
1. LITTLE INTEREST OR PLEASURE IN DOING THINGS: 0
SUM OF ALL RESPONSES TO PHQ QUESTIONS 1-9: 0
SUM OF ALL RESPONSES TO PHQ9 QUESTIONS 1 & 2: 0
SUM OF ALL RESPONSES TO PHQ QUESTIONS 1-9: 0

## 2019-10-02 ASSESSMENT — ENCOUNTER SYMPTOMS
COLOR CHANGE: 0
VOICE CHANGE: 0
SINUS PRESSURE: 0
RHINORRHEA: 0
NAUSEA: 0
SHORTNESS OF BREATH: 0
EYE DISCHARGE: 0
ABDOMINAL PAIN: 0
SORE THROAT: 0
DIARRHEA: 0
VOMITING: 0
EYE PAIN: 0
CHEST TIGHTNESS: 0
CONSTIPATION: 0
ABDOMINAL DISTENTION: 0
BACK PAIN: 0

## 2019-10-07 ENCOUNTER — HOSPITAL ENCOUNTER (OUTPATIENT)
Age: 74
Setting detail: SPECIMEN
Discharge: HOME | End: 2019-10-07
Payer: MEDICARE

## 2019-10-07 DIAGNOSIS — E88.81 INSULIN RESISTANCE: ICD-10-CM

## 2019-10-11 LAB
INSULIN FREE: 26 UIU/ML (ref 3–19)
INSULIN: 28 UIU/ML (ref 3–19)

## 2019-10-23 DIAGNOSIS — N40.1 BENIGN PROSTATIC HYPERPLASIA WITH LOWER URINARY TRACT SYMPTOMS, SYMPTOM DETAILS UNSPECIFIED: ICD-10-CM

## 2019-10-23 RX ORDER — FINASTERIDE 5 MG/1
TABLET, FILM COATED ORAL
Qty: 90 TABLET | Refills: 1 | Status: SHIPPED | OUTPATIENT
Start: 2019-10-23 | End: 2020-04-16

## 2019-10-29 ENCOUNTER — TELEPHONE (OUTPATIENT)
Dept: FAMILY MEDICINE CLINIC | Age: 74
End: 2019-10-29

## 2019-12-02 DIAGNOSIS — A60.00 GENITAL HERPES SIMPLEX, UNSPECIFIED SITE: ICD-10-CM

## 2019-12-02 RX ORDER — VALACYCLOVIR HYDROCHLORIDE 500 MG/1
TABLET, FILM COATED ORAL
Qty: 90 TABLET | Refills: 1 | Status: SHIPPED | OUTPATIENT
Start: 2019-12-02 | End: 2020-05-28

## 2020-01-07 ENCOUNTER — OFFICE VISIT (OUTPATIENT)
Dept: FAMILY MEDICINE CLINIC | Age: 75
End: 2020-01-07
Payer: MEDICARE

## 2020-01-07 VITALS
HEART RATE: 69 BPM | HEIGHT: 69 IN | OXYGEN SATURATION: 95 % | DIASTOLIC BLOOD PRESSURE: 86 MMHG | BODY MASS INDEX: 36.08 KG/M2 | SYSTOLIC BLOOD PRESSURE: 122 MMHG | WEIGHT: 243.6 LBS

## 2020-01-07 PROCEDURE — G8427 DOCREV CUR MEDS BY ELIG CLIN: HCPCS | Performed by: FAMILY MEDICINE

## 2020-01-07 PROCEDURE — 3017F COLORECTAL CA SCREEN DOC REV: CPT | Performed by: FAMILY MEDICINE

## 2020-01-07 PROCEDURE — 4040F PNEUMOC VAC/ADMIN/RCVD: CPT | Performed by: FAMILY MEDICINE

## 2020-01-07 PROCEDURE — 1036F TOBACCO NON-USER: CPT | Performed by: FAMILY MEDICINE

## 2020-01-07 PROCEDURE — 99214 OFFICE O/P EST MOD 30 MIN: CPT | Performed by: FAMILY MEDICINE

## 2020-01-07 PROCEDURE — G8417 CALC BMI ABV UP PARAM F/U: HCPCS | Performed by: FAMILY MEDICINE

## 2020-01-07 PROCEDURE — G8482 FLU IMMUNIZE ORDER/ADMIN: HCPCS | Performed by: FAMILY MEDICINE

## 2020-01-07 PROCEDURE — 1123F ACP DISCUSS/DSCN MKR DOCD: CPT | Performed by: FAMILY MEDICINE

## 2020-01-07 ASSESSMENT — ENCOUNTER SYMPTOMS
CONSTIPATION: 0
COLOR CHANGE: 0
VOICE CHANGE: 0
ABDOMINAL PAIN: 0
SHORTNESS OF BREATH: 0
EYE DISCHARGE: 0
RHINORRHEA: 0
SINUS PRESSURE: 0
NAUSEA: 0
SORE THROAT: 0
CHEST TIGHTNESS: 0
ABDOMINAL DISTENTION: 0
BACK PAIN: 0
DIARRHEA: 0
VOMITING: 0
EYE PAIN: 0

## 2020-01-07 ASSESSMENT — PATIENT HEALTH QUESTIONNAIRE - PHQ9
SUM OF ALL RESPONSES TO PHQ9 QUESTIONS 1 & 2: 0
2. FEELING DOWN, DEPRESSED OR HOPELESS: 0
1. LITTLE INTEREST OR PLEASURE IN DOING THINGS: 0
SUM OF ALL RESPONSES TO PHQ9 QUESTIONS 1 & 2: 0
2. FEELING DOWN, DEPRESSED OR HOPELESS: 0
SUM OF ALL RESPONSES TO PHQ QUESTIONS 1-9: 0
1. LITTLE INTEREST OR PLEASURE IN DOING THINGS: 0
SUM OF ALL RESPONSES TO PHQ QUESTIONS 1-9: 0

## 2020-01-07 NOTE — PROGRESS NOTES
Reflex    Vitamin D 25 Hydroxy       Outpatient Encounter Medications as of 1/7/2020   Medication Sig Dispense Refill    metFORMIN (GLUCOPHAGE) 500 MG tablet Take 1 tablet by mouth daily (with breakfast) 90 tablet 1    valACYclovir (VALTREX) 500 MG tablet take 1 tablet by mouth once daily 90 tablet 1    finasteride (PROSCAR) 5 MG tablet take 1 tablet by mouth once daily 90 tablet 1    metoprolol succinate (TOPROL XL) 25 MG extended release tablet take 1 tablet by mouth once daily 90 tablet 1    baclofen (LIORESAL) 10 MG tablet Take 1 tablet by mouth nightly 30 tablet 0    famotidine (PEPCID) 20 MG tablet take 1 tablet by mouth twice a day 180 tablet 1    atorvastatin (LIPITOR) 20 MG tablet take 1/2 tablet by mouth daily 90 tablet 1    [DISCONTINUED] Cholecalciferol ([employer] VITAMIN D) 2000 units CAPS Take 1 capsule by mouth daily (Patient not taking: Reported on 1/7/2020) 90 capsule 5     No facility-administered encounter medications on file as of 1/7/2020.             Franklin Gutierres MD

## 2020-02-04 ENCOUNTER — HOSPITAL ENCOUNTER (OUTPATIENT)
Age: 75
Setting detail: SPECIMEN
Discharge: HOME | End: 2020-02-04
Payer: MEDICARE

## 2020-02-04 PROBLEM — E55.9 VITAMIN D DEFICIENCY: Status: ACTIVE | Noted: 2020-02-04

## 2020-02-04 LAB
ALBUMIN SERPL-MCNC: 4.2 G/DL (ref 3.5–5.2)
ALBUMIN/GLOBULIN RATIO: 1.8 (ref 1–2.5)
ALP BLD-CCNC: 69 U/L (ref 40–129)
ALT SERPL-CCNC: 67 U/L (ref 5–41)
ANION GAP SERPL CALCULATED.3IONS-SCNC: 13 MMOL/L (ref 9–17)
AST SERPL-CCNC: 48 U/L
BILIRUB SERPL-MCNC: 0.6 MG/DL (ref 0.3–1.2)
BUN BLDV-MCNC: 17 MG/DL (ref 8–23)
BUN/CREAT BLD: ABNORMAL (ref 9–20)
CALCIUM SERPL-MCNC: 10.3 MG/DL (ref 8.6–10.4)
CHLORIDE BLD-SCNC: 105 MMOL/L (ref 98–107)
CHOLESTEROL/HDL RATIO: 3.7
CHOLESTEROL: 145 MG/DL
CO2: 22 MMOL/L (ref 20–31)
CREAT SERPL-MCNC: 0.86 MG/DL (ref 0.7–1.2)
ESTIMATED AVERAGE GLUCOSE: 103 MG/DL
GFR AFRICAN AMERICAN: >60 ML/MIN
GFR NON-AFRICAN AMERICAN: >60 ML/MIN
GFR SERPL CREATININE-BSD FRML MDRD: ABNORMAL ML/MIN/{1.73_M2}
GFR SERPL CREATININE-BSD FRML MDRD: ABNORMAL ML/MIN/{1.73_M2}
GLUCOSE BLD-MCNC: 103 MG/DL (ref 70–99)
HBA1C MFR BLD: 5.2 % (ref 4–6)
HCT VFR BLD CALC: 45 % (ref 40.7–50.3)
HDLC SERPL-MCNC: 39 MG/DL
HEMOGLOBIN: 15.2 G/DL (ref 13–17)
LDL CHOLESTEROL: 63 MG/DL (ref 0–130)
MCH RBC QN AUTO: 33.9 PG (ref 25.2–33.5)
MCHC RBC AUTO-ENTMCNC: 33.8 G/DL (ref 28.4–34.8)
MCV RBC AUTO: 100.2 FL (ref 82.6–102.9)
NRBC AUTOMATED: 0 PER 100 WBC
PDW BLD-RTO: 13.4 % (ref 11.8–14.4)
PLATELET # BLD: 156 K/UL (ref 138–453)
PMV BLD AUTO: 11.3 FL (ref 8.1–13.5)
POTASSIUM SERPL-SCNC: 4.3 MMOL/L (ref 3.7–5.3)
PROSTATE SPECIFIC ANTIGEN: 3.99 UG/L
RBC # BLD: 4.49 M/UL (ref 4.21–5.77)
SODIUM BLD-SCNC: 140 MMOL/L (ref 135–144)
TOTAL PROTEIN: 6.5 G/DL (ref 6.4–8.3)
TRIGL SERPL-MCNC: 214 MG/DL
TSH SERPL DL<=0.05 MIU/L-ACNC: 4.24 MIU/L (ref 0.3–5)
VITAMIN D 25-HYDROXY: 25.3 NG/ML (ref 30–100)
VLDLC SERPL CALC-MCNC: ABNORMAL MG/DL (ref 1–30)
WBC # BLD: 5.4 K/UL (ref 3.5–11.3)

## 2020-02-05 LAB
INSULIN FREE: 27 UIU/ML (ref 3–19)
INSULIN: 32 UIU/ML (ref 3–19)

## 2020-02-07 ENCOUNTER — OFFICE VISIT (OUTPATIENT)
Dept: FAMILY MEDICINE CLINIC | Age: 75
End: 2020-02-07
Payer: MEDICARE

## 2020-02-07 VITALS
SYSTOLIC BLOOD PRESSURE: 119 MMHG | OXYGEN SATURATION: 95 % | WEIGHT: 247.2 LBS | DIASTOLIC BLOOD PRESSURE: 77 MMHG | HEART RATE: 59 BPM | BODY MASS INDEX: 36.61 KG/M2 | HEIGHT: 69 IN

## 2020-02-07 PROCEDURE — G0439 PPPS, SUBSEQ VISIT: HCPCS | Performed by: FAMILY MEDICINE

## 2020-02-07 PROCEDURE — 3017F COLORECTAL CA SCREEN DOC REV: CPT | Performed by: FAMILY MEDICINE

## 2020-02-07 PROCEDURE — 1123F ACP DISCUSS/DSCN MKR DOCD: CPT | Performed by: FAMILY MEDICINE

## 2020-02-07 PROCEDURE — G8482 FLU IMMUNIZE ORDER/ADMIN: HCPCS | Performed by: FAMILY MEDICINE

## 2020-02-07 PROCEDURE — 3044F HG A1C LEVEL LT 7.0%: CPT | Performed by: FAMILY MEDICINE

## 2020-02-07 PROCEDURE — 4040F PNEUMOC VAC/ADMIN/RCVD: CPT | Performed by: FAMILY MEDICINE

## 2020-02-07 ASSESSMENT — PATIENT HEALTH QUESTIONNAIRE - PHQ9
SUM OF ALL RESPONSES TO PHQ QUESTIONS 1-9: 0
2. FEELING DOWN, DEPRESSED OR HOPELESS: 0
SUM OF ALL RESPONSES TO PHQ QUESTIONS 1-9: 0
SUM OF ALL RESPONSES TO PHQ9 QUESTIONS 1 & 2: 0
SUM OF ALL RESPONSES TO PHQ QUESTIONS 1-9: 0
1. LITTLE INTEREST OR PLEASURE IN DOING THINGS: 0
SUM OF ALL RESPONSES TO PHQ QUESTIONS 1-9: 0

## 2020-02-07 ASSESSMENT — LIFESTYLE VARIABLES: HOW OFTEN DO YOU HAVE A DRINK CONTAINING ALCOHOL: 0

## 2020-02-07 NOTE — PROGRESS NOTES
Medicare Annual Wellness Visit  Name: Hortensia Lanes Date: 2020   MRN: [id number] Sex: Male   Age: 69 y.o. Ethnicity: Non-/Non    : 1945 Race: Quentin Velazco is here for Medicare AWV and Medication Refill (all meds)    Screenings for behavioral, psychosocial and functional/safety risks, and cognitive dysfunction are all negative except as indicated below. These results, as well as other patient data from the [address] form, are documented in Flowsheets linked to this Encounter. Allergies   Allergen Reactions    Dicloxacillin     Pcn [Penicillins]        Prior to Visit Medications    Medication Sig Taking?  Authorizing Provider   metFORMIN (GLUCOPHAGE) 500 MG tablet Take 1 tablet by mouth 2 times daily (with meals) Yes Adi Walker MD   valACYclovir (VALTREX) 500 MG tablet take 1 tablet by mouth once daily Yes Adi Walker MD   finasteride (PROSCAR) 5 MG tablet take 1 tablet by mouth once daily Yes Adi aWlker MD   metoprolol succinate (TOPROL XL) 25 MG extended release tablet take 1 tablet by mouth once daily Yes Adi Walker MD   baclofen (LIORESAL) 10 MG tablet Take 1 tablet by mouth nightly Yes Micheline Burnett MD   famotidine (PEPCID) 20 MG tablet take 1 tablet by mouth twice a day Yes Adi Walker MD   atorvastatin (LIPITOR) 20 MG tablet take 1/2 tablet by mouth daily Yes Adi Walker MD       Past Medical History:   Diagnosis Date    Dyslipidemia     ED (erectile dysfunction)     Genital herpes     PSA elevation     Social anxiety disorder        Past Surgical History:   Procedure Laterality Date    HAND SURGERY      x5    TONSILLECTOMY         Family History   Problem Relation Age of Onset    Diabetes Mother     Cancer Sister     Cancer Brother         Lung       CareTeam (Including outside providers/suppliers regularly involved in providing care):   Patient Care Team:  Adi Walker MD as PCP - General use of insulin (HCC)  -     metFORMIN (GLUCOPHAGE) 500 MG tablet; Take 1 tablet by mouth 2 times daily (with meals)  -     Comprehensive Metabolic Panel; Future  -     Microalbumin, Ur; Future  -     Microalbumin / Creatinine Urine Ratio; Future  -     Hemoglobin A1C; Future  -     Insulin, Free; Future    Insulin resistance  -     metFORMIN (GLUCOPHAGE) 500 MG tablet;  Take 1 tablet by mouth 2 times daily (with meals)  -     Insulin, Free; Future    Essential hypertension    Dyslipidemia    Routine general medical examination at a health care facility

## 2020-02-21 ENCOUNTER — TELEPHONE (OUTPATIENT)
Dept: PEDIATRICS CLINIC | Age: 75
End: 2020-02-21

## 2020-03-02 RX ORDER — METOPROLOL SUCCINATE 25 MG/1
TABLET, EXTENDED RELEASE ORAL
Qty: 90 TABLET | Refills: 1 | Status: SHIPPED | OUTPATIENT
Start: 2020-03-02 | End: 2020-08-24 | Stop reason: SDUPTHER

## 2020-04-16 RX ORDER — FAMOTIDINE 20 MG/1
TABLET, FILM COATED ORAL
Qty: 180 TABLET | Refills: 1 | Status: SHIPPED | OUTPATIENT
Start: 2020-04-16

## 2020-04-16 RX ORDER — FINASTERIDE 5 MG/1
TABLET, FILM COATED ORAL
Qty: 90 TABLET | Refills: 1 | Status: SHIPPED | OUTPATIENT
Start: 2020-04-16 | End: 2020-10-19

## 2020-04-16 RX ORDER — ATORVASTATIN CALCIUM 20 MG/1
TABLET, FILM COATED ORAL
Qty: 90 TABLET | Refills: 1 | Status: SHIPPED | OUTPATIENT
Start: 2020-04-16

## 2020-05-28 RX ORDER — VALACYCLOVIR HYDROCHLORIDE 500 MG/1
TABLET, FILM COATED ORAL
Qty: 90 TABLET | Refills: 1 | Status: SHIPPED | OUTPATIENT
Start: 2020-05-28 | End: 2020-11-18 | Stop reason: SDUPTHER

## 2020-07-13 ENCOUNTER — HOSPITAL ENCOUNTER (OUTPATIENT)
Age: 75
Setting detail: SPECIMEN
Discharge: HOME | End: 2020-07-13
Payer: MEDICARE

## 2020-07-13 LAB
ALBUMIN SERPL-MCNC: 4.6 G/DL (ref 3.5–5.2)
ALBUMIN/GLOBULIN RATIO: 2.1 (ref 1–2.5)
ALP BLD-CCNC: 69 U/L (ref 40–129)
ALT SERPL-CCNC: 57 U/L (ref 5–41)
ANION GAP SERPL CALCULATED.3IONS-SCNC: 16 MMOL/L (ref 9–17)
AST SERPL-CCNC: 44 U/L
BILIRUB SERPL-MCNC: 0.78 MG/DL (ref 0.3–1.2)
BUN BLDV-MCNC: 23 MG/DL (ref 8–23)
BUN/CREAT BLD: ABNORMAL (ref 9–20)
CALCIUM SERPL-MCNC: 10.5 MG/DL (ref 8.6–10.4)
CHLORIDE BLD-SCNC: 108 MMOL/L (ref 98–107)
CO2: 21 MMOL/L (ref 20–31)
CREAT SERPL-MCNC: 0.97 MG/DL (ref 0.7–1.2)
CREATININE URINE: 206.6 MG/DL (ref 39–259)
ESTIMATED AVERAGE GLUCOSE: 105 MG/DL
GFR AFRICAN AMERICAN: >60 ML/MIN
GFR NON-AFRICAN AMERICAN: >60 ML/MIN
GFR SERPL CREATININE-BSD FRML MDRD: ABNORMAL ML/MIN/{1.73_M2}
GFR SERPL CREATININE-BSD FRML MDRD: ABNORMAL ML/MIN/{1.73_M2}
GLUCOSE BLD-MCNC: 95 MG/DL (ref 70–99)
HBA1C MFR BLD: 5.3 % (ref 4–6)
MICROALBUMIN/CREAT 24H UR: 37 MG/L
MICROALBUMIN/CREAT UR-RTO: 18 MCG/MG CREAT
POTASSIUM SERPL-SCNC: 4.8 MMOL/L (ref 3.7–5.3)
SODIUM BLD-SCNC: 145 MMOL/L (ref 135–144)
TOTAL PROTEIN: 6.8 G/DL (ref 6.4–8.3)

## 2020-07-14 PROBLEM — E87.0 HYPERNATREMIA: Status: ACTIVE | Noted: 2020-07-14

## 2020-07-14 PROBLEM — E83.52 HYPERCALCEMIA: Status: ACTIVE | Noted: 2020-07-14

## 2020-07-16 LAB
INSULIN FREE: 15 UIU/ML (ref 3–19)
INSULIN: 17 UIU/ML (ref 3–19)

## 2020-07-23 ENCOUNTER — OFFICE VISIT (OUTPATIENT)
Dept: FAMILY MEDICINE CLINIC | Age: 75
End: 2020-07-23
Payer: MEDICARE

## 2020-07-23 VITALS
DIASTOLIC BLOOD PRESSURE: 85 MMHG | TEMPERATURE: 63 F | BODY MASS INDEX: 34.66 KG/M2 | SYSTOLIC BLOOD PRESSURE: 123 MMHG | OXYGEN SATURATION: 98 % | WEIGHT: 234 LBS | HEIGHT: 69 IN | HEART RATE: 63 BPM

## 2020-07-23 PROBLEM — R97.20 ELEVATED PSA: Status: ACTIVE | Noted: 2017-02-09

## 2020-07-23 PROBLEM — N40.1 BENIGN PROSTATIC HYPERPLASIA WITH URINARY OBSTRUCTION: Status: ACTIVE | Noted: 2017-02-09

## 2020-07-23 PROBLEM — N13.8 BENIGN PROSTATIC HYPERPLASIA WITH URINARY OBSTRUCTION: Status: ACTIVE | Noted: 2017-02-09

## 2020-07-23 PROBLEM — N28.1 RENAL CYST: Status: ACTIVE | Noted: 2017-02-09

## 2020-07-23 PROBLEM — E66.01 MORBIDLY OBESE (HCC): Status: ACTIVE | Noted: 2017-09-25

## 2020-07-23 PROCEDURE — 4040F PNEUMOC VAC/ADMIN/RCVD: CPT | Performed by: FAMILY MEDICINE

## 2020-07-23 PROCEDURE — G8427 DOCREV CUR MEDS BY ELIG CLIN: HCPCS | Performed by: FAMILY MEDICINE

## 2020-07-23 PROCEDURE — 3017F COLORECTAL CA SCREEN DOC REV: CPT | Performed by: FAMILY MEDICINE

## 2020-07-23 PROCEDURE — 99214 OFFICE O/P EST MOD 30 MIN: CPT | Performed by: FAMILY MEDICINE

## 2020-07-23 PROCEDURE — 1123F ACP DISCUSS/DSCN MKR DOCD: CPT | Performed by: FAMILY MEDICINE

## 2020-07-23 PROCEDURE — G8417 CALC BMI ABV UP PARAM F/U: HCPCS | Performed by: FAMILY MEDICINE

## 2020-07-23 PROCEDURE — 1036F TOBACCO NON-USER: CPT | Performed by: FAMILY MEDICINE

## 2020-07-23 SDOH — HEALTH STABILITY: MENTAL HEALTH: HOW MANY STANDARD DRINKS CONTAINING ALCOHOL DO YOU HAVE ON A TYPICAL DAY?: 1 OR 2

## 2020-07-23 SDOH — HEALTH STABILITY: MENTAL HEALTH: HOW OFTEN DO YOU HAVE A DRINK CONTAINING ALCOHOL?: MONTHLY OR LESS

## 2020-07-23 ASSESSMENT — ENCOUNTER SYMPTOMS
COLOR CHANGE: 0
SORE THROAT: 0
ABDOMINAL PAIN: 0
ABDOMINAL DISTENTION: 0
DIARRHEA: 0
RHINORRHEA: 0
CHEST TIGHTNESS: 0
SINUS PRESSURE: 0
VOMITING: 0
BACK PAIN: 0
VOICE CHANGE: 0
SHORTNESS OF BREATH: 0
EYE PAIN: 0
CONSTIPATION: 0
EYE DISCHARGE: 0
NAUSEA: 0

## 2020-07-23 ASSESSMENT — PATIENT HEALTH QUESTIONNAIRE - PHQ9
SUM OF ALL RESPONSES TO PHQ QUESTIONS 1-9: 0
SUM OF ALL RESPONSES TO PHQ QUESTIONS 1-9: 0
2. FEELING DOWN, DEPRESSED OR HOPELESS: 0
1. LITTLE INTEREST OR PLEASURE IN DOING THINGS: 0
SUM OF ALL RESPONSES TO PHQ9 QUESTIONS 1 & 2: 0

## 2020-07-23 NOTE — PROGRESS NOTES
Subjective:      Patient ID: Phyllis Saenz is a 69 y.o. male. HPI Here for follow up of obesity, HTN, Elevated LFT, Elevated PFT as well as NIKKI. States has been eating healthy and exercising, may need to drink more water as Na as well   as Ca levels are elevated this time. LFTs are elevated but stable compared to labs in Feb.  Sleep, mood, appetite are all ok. Bowel and Bladder ok as well. Sometimes takes tylenol PM.  This is when he has diff with sleep on and off. Overall feels well, taking covid precautions, has food delivered from [employer]. States wife has cardiac disease and asthma, so taking extra precautions. Review of Systems   Constitutional: Negative for activity change, appetite change and fatigue. HENT: Negative for congestion, dental problem, hearing loss, rhinorrhea, sinus pressure, sneezing, sore throat, tinnitus and voice change. Eyes: Negative for pain, discharge and visual disturbance. Respiratory: Negative for chest tightness and shortness of breath. Cardiovascular: Negative for chest pain and palpitations. Gastrointestinal: Negative for abdominal distention, abdominal pain, constipation, diarrhea, nausea and vomiting. Endocrine: Negative for cold intolerance and heat intolerance. Genitourinary: Negative for decreased urine volume, difficulty urinating, frequency and urgency. Musculoskeletal: Negative for arthralgias, back pain, gait problem, joint swelling and myalgias. Skin: Negative for color change, pallor and rash. Allergic/Immunologic: Negative for environmental allergies and food allergies. Neurological: Negative for dizziness, tremors, weakness and headaches. Hematological: Negative for adenopathy. Does not bruise/bleed easily. Psychiatric/Behavioral: Negative for agitation, behavioral problems and sleep disturbance. The patient is not nervous/anxious. Objective:   Physical Exam  Constitutional:       General: He is not in acute distress.   HENT: daily 90 tablet 1    finasteride (PROSCAR) 5 MG tablet take 1 tablet by mouth once daily 90 tablet 1    atorvastatin (LIPITOR) 20 MG tablet take 1/2 tablet by mouth daily 90 tablet 1    famotidine (PEPCID) 20 MG tablet take 1 tablet by mouth twice a day 180 tablet 1    metoprolol succinate (TOPROL XL) 25 MG extended release tablet take 1 tablet by mouth once daily 90 tablet 1    metFORMIN (GLUCOPHAGE) 500 MG tablet Take 1 tablet by mouth 2 times daily (with meals) 180 tablet 1    baclofen (LIORESAL) 10 MG tablet Take 1 tablet by mouth nightly 30 tablet 0     No facility-administered encounter medications on file as of 7/23/2020.             Usman Harrison MD

## 2020-08-24 RX ORDER — METOPROLOL SUCCINATE 25 MG/1
25 TABLET, EXTENDED RELEASE ORAL DAILY
Qty: 90 TABLET | Refills: 1 | Status: SHIPPED | OUTPATIENT
Start: 2020-08-24

## 2020-10-19 RX ORDER — FINASTERIDE 5 MG/1
TABLET, FILM COATED ORAL
Qty: 90 TABLET | Refills: 1 | Status: SHIPPED | OUTPATIENT
Start: 2020-10-19

## 2020-10-28 ENCOUNTER — OFFICE VISIT (OUTPATIENT)
Dept: FAMILY MEDICINE CLINIC | Age: 75
End: 2020-10-28
Payer: MEDICARE

## 2020-10-28 VITALS
OXYGEN SATURATION: 99 % | WEIGHT: 224.4 LBS | TEMPERATURE: 97.2 F | HEART RATE: 55 BPM | BODY MASS INDEX: 33.24 KG/M2 | SYSTOLIC BLOOD PRESSURE: 123 MMHG | HEIGHT: 69 IN | DIASTOLIC BLOOD PRESSURE: 85 MMHG

## 2020-10-28 PROBLEM — J44.9 CHRONIC OBSTRUCTIVE PULMONARY DISEASE (HCC): Status: ACTIVE | Noted: 2020-10-28

## 2020-10-28 PROBLEM — G47.33 OBSTRUCTIVE SLEEP APNEA SYNDROME: Status: ACTIVE | Noted: 2020-10-28

## 2020-10-28 PROBLEM — R74.8 ELEVATED LIVER ENZYMES: Status: ACTIVE | Noted: 2020-10-28

## 2020-10-28 PROBLEM — R25.1 TREMOR: Status: ACTIVE | Noted: 2020-10-28

## 2020-10-28 PROBLEM — E66.9 OBESITY: Status: ACTIVE | Noted: 2020-10-28

## 2020-10-28 PROBLEM — H49.10 FOURTH NERVE PALSY: Status: ACTIVE | Noted: 2020-10-28

## 2020-10-28 PROBLEM — K21.9 GASTROESOPHAGEAL REFLUX DISEASE: Status: ACTIVE | Noted: 2020-10-28

## 2020-10-28 PROBLEM — R43.8 HYPOSMIA: Status: ACTIVE | Noted: 2020-10-28

## 2020-10-28 PROBLEM — H90.3 SENSORINEURAL HEARING LOSS, BILATERAL: Status: ACTIVE | Noted: 2020-10-28

## 2020-10-28 PROBLEM — H53.2 DIPLOPIA: Status: ACTIVE | Noted: 2020-10-28

## 2020-10-28 PROBLEM — F32.9 MAJOR DEPRESSION, SINGLE EPISODE: Status: ACTIVE | Noted: 2020-10-28

## 2020-10-28 PROCEDURE — G8417 CALC BMI ABV UP PARAM F/U: HCPCS | Performed by: FAMILY MEDICINE

## 2020-10-28 PROCEDURE — 99214 OFFICE O/P EST MOD 30 MIN: CPT | Performed by: FAMILY MEDICINE

## 2020-10-28 PROCEDURE — 90694 VACC AIIV4 NO PRSRV 0.5ML IM: CPT | Performed by: FAMILY MEDICINE

## 2020-10-28 PROCEDURE — 1036F TOBACCO NON-USER: CPT | Performed by: FAMILY MEDICINE

## 2020-10-28 PROCEDURE — 3017F COLORECTAL CA SCREEN DOC REV: CPT | Performed by: FAMILY MEDICINE

## 2020-10-28 PROCEDURE — G0008 ADMIN INFLUENZA VIRUS VAC: HCPCS | Performed by: FAMILY MEDICINE

## 2020-10-28 PROCEDURE — 4040F PNEUMOC VAC/ADMIN/RCVD: CPT | Performed by: FAMILY MEDICINE

## 2020-10-28 PROCEDURE — G8427 DOCREV CUR MEDS BY ELIG CLIN: HCPCS | Performed by: FAMILY MEDICINE

## 2020-10-28 PROCEDURE — 1123F ACP DISCUSS/DSCN MKR DOCD: CPT | Performed by: FAMILY MEDICINE

## 2020-10-28 PROCEDURE — G8484 FLU IMMUNIZE NO ADMIN: HCPCS | Performed by: FAMILY MEDICINE

## 2020-10-28 ASSESSMENT — ENCOUNTER SYMPTOMS
NAUSEA: 0
VOMITING: 0
ABDOMINAL PAIN: 0
EYE PAIN: 0
SINUS PRESSURE: 0
SHORTNESS OF BREATH: 0
VOICE CHANGE: 0
EYE DISCHARGE: 0
SORE THROAT: 0
BACK PAIN: 0
CHEST TIGHTNESS: 0
ABDOMINAL DISTENTION: 0
CONSTIPATION: 0
RHINORRHEA: 0
DIARRHEA: 0
COLOR CHANGE: 0

## 2020-10-28 ASSESSMENT — PATIENT HEALTH QUESTIONNAIRE - PHQ9
SUM OF ALL RESPONSES TO PHQ QUESTIONS 1-9: 0
2. FEELING DOWN, DEPRESSED OR HOPELESS: 0
SUM OF ALL RESPONSES TO PHQ QUESTIONS 1-9: 0
1. LITTLE INTEREST OR PLEASURE IN DOING THINGS: 0
SUM OF ALL RESPONSES TO PHQ QUESTIONS 1-9: 0
SUM OF ALL RESPONSES TO PHQ9 QUESTIONS 1 & 2: 0

## 2020-10-28 NOTE — PROGRESS NOTES
Subjective:      Patient ID: Gerard Kaminski is a 69 y.o. male. [facility]  States will be moving to [place] mid Nov.Had some abnormal labs in July - sodium, Calcium and LFT s mildly elevated- so will recheck and then follow up with new PCP in [place]. Dr Sonam Arzate wants him to do PSA every 6 monbths and so is due for this as well. Mood, sleep, appetite ok, bowels are regular, bladder habits ok. Exercises by walking a few miles daily. No new complaints. Will get flu shot today. Review of Systems   Constitutional: Negative for activity change, appetite change and fatigue. HENT: Negative for congestion, dental problem, hearing loss, rhinorrhea, sinus pressure, sneezing, sore throat, tinnitus and voice change. Eyes: Negative for pain, discharge and visual disturbance. Respiratory: Negative for chest tightness and shortness of breath. Cardiovascular: Negative for chest pain and palpitations. Gastrointestinal: Negative for abdominal distention, abdominal pain, constipation, diarrhea, nausea and vomiting. Endocrine: Negative for cold intolerance and heat intolerance. Genitourinary: Negative for decreased urine volume, difficulty urinating, frequency and urgency. Musculoskeletal: Negative for arthralgias, back pain, gait problem, joint swelling and myalgias. Skin: Negative for color change, pallor and rash. Allergic/Immunologic: Negative for environmental allergies and food allergies. Neurological: Negative for dizziness, tremors, weakness and headaches. Hematological: Negative for adenopathy. Does not bruise/bleed easily. Psychiatric/Behavioral: Negative for agitation, behavioral problems and sleep disturbance. The patient is not nervous/anxious. Objective:   Physical Exam  Constitutional:       General: He is not in acute distress. HENT:      Head: Normocephalic and atraumatic.       Right Ear: External ear normal.      Left Ear: External ear normal.   Eyes:      Conjunctiva/sclera: Conjunctivae normal.      Pupils: Pupils are equal, round, and reactive to light. Neck:      Musculoskeletal: Normal range of motion. Thyroid: No thyromegaly. Trachea: No tracheal deviation. Cardiovascular:      Rate and Rhythm: Normal rate and regular rhythm. Heart sounds: No murmur. No friction rub. No gallop. Pulmonary:      Effort: No respiratory distress. Breath sounds: No stridor. No wheezing or rales. Chest:      Chest wall: No tenderness. Abdominal:      General: Bowel sounds are normal. There is no distension. Palpations: Abdomen is soft. Tenderness: There is no abdominal tenderness. There is no rebound. Musculoskeletal: Normal range of motion. Lymphadenopathy:      Cervical: No cervical adenopathy. Skin:     General: Skin is warm. Coloration: Skin is not pale. Findings: No erythema or rash. Neurological:      Mental Status: He is alert and oriented to person, place, and time. Cranial Nerves: No cranial nerve deficit. Motor: No abnormal muscle tone. Deep Tendon Reflexes: Reflexes normal.         Assessment:       Diagnosis Orders   1. Need for influenza vaccination  INFLUENZA, QUADV, ADJUVANTED, 65 YRS =, IM, PF, PREFILL SYR, 0.5ML (FLUAD)   2. Essential hypertension     3. Morbidly obese (HCC)     4. Elevated LFTs  Comprehensive Metabolic Panel   5. Hyperinsulinemia     6. History of elevated PSA     7. Gastroesophageal reflux disease, unspecified whether esophagitis present     8. Insulin resistance     9. Vitamin D deficiency     10. Hypernatremia  Comprehensive Metabolic Panel   11.  Hypercalcemia  Comprehensive Metabolic Panel     Orders Placed This Encounter   Procedures    INFLUENZA, QUADV, ADJUVANTED, 65 YRS =, IM, PF, PREFILL SYR, 0.5ML (FLUAD)    Comprehensive Metabolic Panel       Outpatient Encounter Medications as of 10/28/2020   Medication Sig Dispense Refill    finasteride (PROSCAR) 5 MG tablet take 1 tablet by mouth

## 2020-11-02 LAB
ALBUMIN SERPL-MCNC: 4.3 G/DL
ALP BLD-CCNC: 55 U/L
ALT SERPL-CCNC: 14 U/L
ANION GAP SERPL CALCULATED.3IONS-SCNC: 6 MMOL/L
AST SERPL-CCNC: 18 U/L
BILIRUB SERPL-MCNC: 0.4 MG/DL (ref 0.1–1.4)
BUN BLDV-MCNC: 14 MG/DL
CALCIUM SERPL-MCNC: 10.1 MG/DL
CHLORIDE BLD-SCNC: 106 MMOL/L
CO2: 30 MMOL/L
CREAT SERPL-MCNC: 0.82 MG/DL
GFR CALCULATED: NORMAL
GLUCOSE BLD-MCNC: 103 MG/DL
POTASSIUM SERPL-SCNC: 4.5 MMOL/L
SODIUM BLD-SCNC: 142 MMOL/L
TOTAL PROTEIN: 6.1

## 2020-11-03 PROBLEM — K21.9 GERD (GASTROESOPHAGEAL REFLUX DISEASE): Status: RESOLVED | Noted: 2019-05-09 | Resolved: 2020-11-03

## 2020-11-18 RX ORDER — VALACYCLOVIR HYDROCHLORIDE 500 MG/1
TABLET, FILM COATED ORAL
Qty: 90 TABLET | Refills: 1 | Status: SHIPPED | OUTPATIENT
Start: 2020-11-18

## 2020-11-18 NOTE — TELEPHONE ENCOUNTER
Last visit: 10/28/20  Last Med refill: 5/28/20  Does patient have enough medication for 72 hours: Yes    Next Visit Date:  Future Appointments   Date Time Provider Suha Abdul   1/28/2021 11:20 AM Norma Shoemaker MD [facility] MED [address] Maintenance   Topic Date Due    Lipid screen  02/04/2021    Annual Wellness Visit (AWV)  02/07/2021    Potassium monitoring  11/02/2021    Creatinine monitoring  11/02/2021    DTaP/Tdap/Td vaccine (2 - Td) 02/17/2027    Colon cancer screen colonoscopy  09/25/2029    Flu vaccine  Completed    Shingles Vaccine  Completed    Pneumococcal 65+ years Vaccine  Completed    AAA screen  Completed    Hepatitis C screen  Addressed    Hepatitis A vaccine  Aged Out    Hib vaccine  Aged Out    Meningococcal (ACWY) vaccine  Aged Out       Hemoglobin A1C (%)   Date Value   07/13/2020 5.3   02/04/2020 5.2   02/13/2019 4.7             ( goal A1C is < 7)   Microalb/Crt.  Ratio (mcg/mg creat)   Date Value   07/13/2020 18 (H)     LDL Cholesterol (mg/dL)   Date Value   02/04/2020 63   02/13/2019 63     LDL Calculated (mg/dL)   Date Value   02/01/2017 89       (goal LDL is <100)   AST (U/L)   Date Value   11/02/2020 18     ALT (U/L)   Date Value   11/02/2020 14     BUN (mg/dL)   Date Value   11/02/2020 14     BP Readings from Last 3 Encounters:   10/28/20 123/85   07/23/20 123/85   02/07/20 119/77          (goal 120/80)    All Future Testing planned in [facility]  Lab Frequency Next Occurrence               Patient Active Problem List:     ED (erectile dysfunction)     NIKKI (generalized anxiety disorder)     PSA elevation     Dyslipidemia     Genital herpes simplex     Essential hypertension     Morbidly obese (HCC)     Elevated LFTs     Degenerative joint disease of low back     Hyperinsulinemia     History of elevated PSA     Insulin resistance     Vitamin D deficiency     Hypernatremia     Hypercalcemia     Benign prostatic hyperplasia with urinary obstruction     Renal cyst Elevated PSA     Chronic obstructive pulmonary disease (HCC)     Diplopia     Fourth nerve palsy     Hyposmia     Major depression, single episode     Obstructive sleep apnea syndrome     Sensorineural hearing loss, bilateral     Tremor     Gastroesophageal reflux disease     Obesity     Elevated liver enzymes

## 2021-04-26 ENCOUNTER — TELEPHONE (OUTPATIENT)
Dept: FAMILY MEDICINE CLINIC | Age: 76
End: 2021-04-26